# Patient Record
Sex: FEMALE | Race: WHITE | Employment: UNEMPLOYED | URBAN - METROPOLITAN AREA
[De-identification: names, ages, dates, MRNs, and addresses within clinical notes are randomized per-mention and may not be internally consistent; named-entity substitution may affect disease eponyms.]

---

## 2017-06-09 ENCOUNTER — OFFICE VISIT (OUTPATIENT)
Dept: ORTHOPEDIC SURGERY | Age: 52
End: 2017-06-09

## 2017-06-09 VITALS
SYSTOLIC BLOOD PRESSURE: 191 MMHG | BODY MASS INDEX: 18.66 KG/M2 | WEIGHT: 126 LBS | HEIGHT: 69 IN | HEART RATE: 89 BPM | DIASTOLIC BLOOD PRESSURE: 95 MMHG

## 2017-06-09 DIAGNOSIS — M51.36 OTHER INTERVERTEBRAL DISC DEGENERATION, LUMBAR REGION: ICD-10-CM

## 2017-06-09 DIAGNOSIS — M96.1 POSTLAMINECTOMY SYNDROME, CERVICAL: ICD-10-CM

## 2017-06-09 DIAGNOSIS — M54.16 LUMBAR NEURITIS: ICD-10-CM

## 2017-06-09 DIAGNOSIS — M47.816 SPONDYLOSIS OF LUMBAR REGION WITHOUT MYELOPATHY OR RADICULOPATHY: Primary | ICD-10-CM

## 2017-06-09 RX ORDER — HYDROCODONE BITARTRATE AND ACETAMINOPHEN 7.5; 325 MG/1; MG/1
TABLET ORAL
COMMUNITY
End: 2017-10-23 | Stop reason: ALTCHOICE

## 2017-06-09 RX ORDER — DIAZEPAM 5 MG/1
5 TABLET ORAL
COMMUNITY
End: 2017-10-23 | Stop reason: ALTCHOICE

## 2017-06-09 RX ORDER — DULOXETIN HYDROCHLORIDE 30 MG/1
30 CAPSULE, DELAYED RELEASE ORAL DAILY
Qty: 30 CAP | Refills: 1 | Status: SHIPPED | OUTPATIENT
Start: 2017-06-09 | End: 2017-10-23 | Stop reason: ALTCHOICE

## 2017-06-09 RX ORDER — DICLOFENAC SODIUM 75 MG/1
TABLET, DELAYED RELEASE ORAL
COMMUNITY

## 2017-06-09 NOTE — PROGRESS NOTES
[facility]  [address], [address]  Phone: [phone]  Fax: [phone]        INITIAL CONSULTATION      HISTORY OF PRESENT ILLNESS:  Helena Velasco is a 45 y.o. female whom is self-referred secondary to low back pain radiating into the BLE (L>R) extending in an S1 distribution to the ankle with paraesthesias x 1 month. No specific injury/trauma. Her pain is not positional. Pt denies h/o lumbar spinal surgery or blocks. She has not attended physical therapy/chiropractor for the lumbar spine. I last saw patient on 6/9/13 neck pain into the LUE, with lesser symptoms in the RUE. of note, her pain was unrelieved with Morphine. I referred her to Dr. Ryan Saenz for surgical evaluation. This is a patient with a diagnosis of cervical postlaminectomy syndrome. She was previously seen by Dr. Ryan Saenz on 4/24/14 and was noted to be 6 months out from her C4-T3 ACDF with significant relief. Of note, she should continue to be followed by Dr. Emily Rios for pain management. Per patient, she was abruptly discontinued off her Oxycodone and Morphine by Dr. Emily Rios and not weaned off roughly 2-3 years ago which is wished to no longer be under his care. ER note dated 6/4/17 indicating patient presented for chronic low back pain. She was treated with Norco, Valium and Prednisone at that time. Pt is noting good relief with Valium and Prednisone. Note from Dr. Justine Pantoja dated 6/5/17 indicating patient was seen with c/o severe hand and back pain which are not relieved with Norco or NSAIDs. Raynaud's is controlled with Procardia and trental. Of note, patient continues to use marijuana for pain control. It appears as though Dr. Justine Pantoja offered to refer patient to pain management which she declined. Noted, patient has taken Neurontin in the past but does not recall what dose. She states she is no longer taking Zoloft, Xanax or Ambien. Pt denies change in bowel or bladder habits.  Pt denies fever, weight loss, or skin changes. Pt denies h/o stomach ulcers or bleeding disorders. Lumbar spine CT dated 6/4/17 reviewed. Per report, no acute osseous abnormality of the lumbar spine. Transitional lumbosacral anatomy. multielvel degenerative disc disease and facet arthropathy. Atherosclerosis. The patient is RHD.  reviewed. Past Medical History:   Diagnosis Date    Cancer [facility])     Skin    Chronic airway obstruction, not elsewhere classified     Essential hypertension, benign     MILDLY ELEVATED    Nausea & vomiting     Nicotine vapor product user     Personal history of tobacco use, presenting hazards to health     Psychiatric disorder     Depression    Raynaud's disease     Shortness of breath     NORMAL STRESS TEST AND LV FUNCTION. LIKELY FROM COPD AND SMOKING          Past Surgical History:   Procedure Laterality Date    HX HYSTERECTOMY      HX OTHER SURGICAL  2011    NECK DISC SURGERY    HX OTHER SURGICAL      RIGHT WRIST         Social History   Substance Use Topics    Smoking status: Current Every Day Smoker     Packs/day: 0.50     Years: 30.00    Smokeless tobacco: Never Used      Comment: Uses a Nicotine    Alcohol use No     Work status: The patient is unemployed. Marital status: The patient is . Current Outpatient Prescriptions   Medication Sig Dispense Refill    diclofenac EC (VOLTAREN) 75 mg EC tablet Take  by mouth.  HYDROcodone-acetaminophen (NORCO) 7.5-325 mg per tablet Take  by mouth.  diazePAM (VALIUM) 5 mg tablet Take 5 mg by mouth every six (6) hours as needed for Anxiety.  DULoxetine (CYMBALTA) 30 mg capsule Take 1 Cap by mouth daily. 30 Cap 1    NIFEdipine XL (PROCARDIA XL) 30 mg tablet Take 60 mg by mouth daily. Indications: HYPERTENSION      pentoxifylline CR (TRENTAL) 400 mg CR tablet Take 400 mg by mouth three (3) times daily (with meals).       oxyCODONE-acetaminophen (PERCOCET)  mg per tablet Take 1 Tab by mouth every eight (8) hours as needed. Indications: PAIN 90 Tab 0    Morphine (OTILIO) 30 mg ER capsule Take 1 Cap by mouth three (3) times daily. Indications: CHRONIC PAIN 90 Cap 0    sertraline (ZOLOFT) 100 mg tablet Take  by mouth daily. Indications: Depression      ALPRAZolam (XANAX) 0.5 mg tablet Take  by mouth three (3) times daily as needed. Indications: ANXIETY WITH DEPRESSION      primidone (MYSOLINE) 50 mg tablet Take  by mouth nightly. Indications: for tremors      zolpidem (AMBIEN) 10 mg tablet Take  by mouth nightly as needed. No Known Allergies       History reviewed. No pertinent family history. REVIEW OF SYSTEMS  Constitutional symptoms: Negative  Eyes: Negative  Ears, Nose, Throat, and Mouth: Negative  Cardiovascular: Negative  Respiratory: Negative  Genitourinary: Negative  Integumentary (Skin and/or breast): Negative  Musculoskeletal: Positive for low back pain into the BLE. Extremities: Negative for edema. Endocrine/Rheumatologic: Negative  Hematologic/Lymphatic: Negative  Allergic/Immunologic: Negative  Psychiatric: Negative       Ambulates with a single point cane. PHYSICAL EXAMINATION    Visit Vitals    BP (!) 191/95  Comment: has not taken meds    Pulse 89    Ht 5' 9\" (1.753 m)    Wt 126 lb (57.2 kg)    BMI 18.61 kg/m2       CONSTITUTIONAL: NAD, A&O x 3  HEART: Regular rate and rhythm  ABDOMEN: Positive bowel sounds, soft, nontender, and nondistended  LUNGS: Clear to auscultation bilaterally. SKIN: No rashes noted. RANGE OF MOTION: The patient has full passive range of motion in all four extremities. SENSATION: Decreased sensation to light touch at S1 of the LLE. Sensation to light touch otherwise intact. MOTOR:   Straight Leg Raise: Negative, bilateral  Cavazos: Negative, bilateral  Deep tendon reflexes are 1+ at the brachioradialis and triceps, and 2+ at the biceps. Deep tendon reflexes are 0 at the knees and ankles bilaterally.      Shoulder AB/Flex Elbow Flex Wrist Ext Elbow Ext Wrist Flex Hand Intrin Tone   Right +4/5 +4/5 +4/5 +4/5 +4/5 +4/5 +4/5   Left +4/5 +4/5 +4/5 +4/5 +4/5 +4/5 +4/5              Hip Flex Knee Ext Knee Flex Ankle DF GTE Ankle PF Tone   Right +4/5 +4/5 +4/5 +4/5 +4/5 +4/5 +4/5   Left +4/5 +4/5 +4/5 +4/5 +4/5 +4/5 +4/5         ASSESSMENT   Angel Chow was seen today for back pain. Diagnoses and all orders for this visit:    Spondylosis of lumbar region without myelopathy or radiculopathy    Postlaminectomy syndrome, cervical    Lumbar neuritis    Other intervertebral disc degeneration, lumbar region    Other orders  -     DULoxetine (CYMBALTA) 30 mg capsule; Take 1 Cap by mouth daily. IMPRESSIONS/RECOMMENDATIONS:  The patient describes symptoms consistent with S1 radiculopathy. She is neurologically intact. We discussed multiple treatment options. Patient is not interested in surgical intervention or lumbar blocks at this time. In the light of the fact that patient continues to use marijuana for pain control, I will not be prescribing narcotics. I will try her on Cymbalta 30 mg per day. The risks, benefits, and potential side effects of this medication were discussed. Patient understands and wishes to proceed. Patient advised to call the office if intolerant to new medication. She declines referral to pain management and physical therapy at this time. I will see the patient back in 1 month's time or earlier if needed. Written by Adrian Yu, as dictated by Jon Ruiz MD  I examined the patient, reviewed and agree with the note.

## 2017-06-09 NOTE — MR AVS SNAPSHOT
Visit Information Date & Time Provider Department Dept. Phone Encounter #  
 6/9/2017  2:30 PM Kristie Avina MD [facility] - SPECIALTY [facility] [phone] Follow-up Instructions Return in about 4 weeks (around 7/7/2017). Upcoming Health Maintenance Date Due Hepatitis C Screening 1965 Pneumococcal 19-64 Medium Risk (1 of 1 - PPSV23) 4/25/1982 DTaP/Tdap/Td series (1 - Tdap) 4/25/1984 PAP AKA CERVICAL CYTOLOGY 4/25/1984 BREAST CANCER SCRN MAMMOGRAM 4/25/2013 FOBT Q 1 YEAR AGE 50-75 4/25/2013 INFLUENZA AGE 9 TO ADULT 8/1/2017 Allergies as of 6/9/2017  Review Complete On: 6/9/2017 By: Kristie Avina MD  
 No Known Allergies Current Immunizations  Never Reviewed No immunizations on file. Not reviewed this visit You Were Diagnosed With   
  
 Codes Comments Spondylosis of lumbar region without myelopathy or radiculopathy    -  Primary ICD-10-CM: M47.816 ICD-9-CM: 721.3 Postlaminectomy syndrome, cervical     ICD-10-CM: M96.1 ICD-9-CM: 722.81 Lumbar neuritis     ICD-10-CM: M54.16 
ICD-9-CM: 724.4 Other intervertebral disc degeneration, lumbar region     ICD-10-CM: M51.36 
ICD-9-CM: 722.52 Vitals BP Pulse Height(growth percentile) Weight(growth percentile) BMI OB Status (!) 191/95 89 5' 9\" (1.753 m) 126 lb (57.2 kg) 18.61 kg/m2 Hysterectomy Smoking Status Current Every Day Smoker Vitals History BMI and BSA Data Body Mass Index Body Surface Area  
 18.61 kg/m 2 1.67 m 2 Preferred Pharmacy Pharmacy Name Phone [id number] [address] Your Updated Medication List  
  
   
This list is accurate as of: 6/9/17  3:53 PM.  Always use your most recent med list.  
  
  
  
  
 AMBIEN 10 mg tablet Generic drug:  zolpidem Take  by mouth nightly as needed. diclofenac EC 75 mg EC tablet Commonly known as:  VOLTAREN Take  by mouth. DULoxetine 30 mg capsule Commonly known as:  CYMBALTA Take 1 Cap by mouth daily. Morphine 30 mg ER capsule Commonly known as:  OTILIO Take 1 Cap by mouth three (3) times daily. Indications: CHRONIC PAIN  
  
 NORCO 7.5-325 mg per tablet Generic drug:  HYDROcodone-acetaminophen Take  by mouth. oxyCODONE-acetaminophen  mg per tablet Commonly known as:  PERCOCET Take 1 Tab by mouth every eight (8) hours as needed. Indications: PAIN  
  
 primidone 50 mg tablet Commonly known as: MYSOLINE Take  by mouth nightly. Indications: for tremors PROCARDIA XL 30 mg ER tablet Generic drug:  NIFEdipine ER Take 60 mg by mouth daily. Indications: HYPERTENSION  
  
 TRENtal 400 mg CR tablet Generic drug:  pentoxifylline CR Take 400 mg by mouth three (3) times daily (with meals). VALIUM 5 mg tablet Generic drug:  diazePAM  
Take 5 mg by mouth every six (6) hours as needed for Anxiety. XANAX 0.5 mg tablet Generic drug:  ALPRAZolam  
Take  by mouth three (3) times daily as needed. Indications: ANXIETY WITH DEPRESSION  
  
 ZOLOFT 100 mg tablet Generic drug:  sertraline Take  by mouth daily. Indications: Depression Prescriptions Sent to Pharmacy Refills DULoxetine (CYMBALTA) 30 mg capsule 1 Sig: Take 1 Cap by mouth daily. Class: Normal  
 [facility] [address] [place] [address] #: [phone] Route: Oral  
  
Follow-up Instructions Return in about 4 weeks (around 7/7/2017). Introducing [facility]! [facility] introduces [employer] patient portal. Now you can access parts of your medical record, email your doctor's office, and request medication refills online. 1. In your internet browser, go to [email or web address] 2. Click on the First Time User? Click Here link in the Sign In box. You will see the New Member Sign Up page. 3. Enter your [employer] Access Code exactly as it appears below. You will not need to use this code after youve completed the sign-up process. If you do not sign up before the expiration date, you must request a new code. · [employer] Access Code: [email or web address] Expires: 9/5/2017 10:36 AM 
 
4. Enter the last four digits of your Social Security Number (xxxx) and Date of Birth (mm/dd/yyyy) as indicated and click Submit. You will be taken to the next sign-up page. 5. Create a [employer] ID. This will be your [employer] login ID and cannot be changed, so think of one that is secure and easy to remember. 6. Create a [employer] password. You can change your password at any time. 7. Enter your Password Reset Question and Answer. This can be used at a later time if you forget your password. 8. Enter your e-mail address. You will receive e-mail notification when new information is available in [address] 9. Click Sign Up. You can now view and download portions of your medical record. 10. Click the Download Summary menu link to download a portable copy of your medical information. If you have questions, please visit the Frequently Asked Questions section of the [employer] website. Remember, [employer] is NOT to be used for urgent needs. For medical emergencies, dial 911. Now available from your iPhone and Android! Please provide this summary of care documentation to your next provider. Your primary care clinician is listed as Babs Clinton. If you have any questions after today's visit, please call [phone].

## 2017-07-06 ENCOUNTER — TELEPHONE (OUTPATIENT)
Dept: ORTHOPEDIC SURGERY | Age: 52
End: 2017-07-06

## 2017-07-06 NOTE — TELEPHONE ENCOUNTER
Pt has appt Monday 7/10/17 with dr Varsha Simpson:  Pt reports excruciating pain and states she cannot wait to be seen. Offered patient appt for today at [facility] but she declined.   Please advise Y#[phone]

## 2017-07-06 NOTE — TELEPHONE ENCOUNTER
If patient declined to be seen today then she can wait until Monday. We offered her a same day appointment that she declined.  We can not give out medications for pain without an appointment per regulations

## 2017-07-06 NOTE — TELEPHONE ENCOUNTER
Patient last seen 6/9/17 as a new/established patient:    IMPRESSIONS/RECOMMENDATIONS:  The patient describes symptoms consistent with S1 radiculopathy. She is neurologically intact. We discussed multiple treatment options. Patient is not interested in surgical intervention or lumbar blocks at this time. In the light of the fact that patient continues to use marijuana for pain control, I will not be prescribing narcotics. I will try her on Cymbalta 30 mg per day. The risks, benefits, and potential side effects of this medication were discussed. Patient understands and wishes to proceed. Patient advised to call the office if intolerant to new medication. She declines referral to pain management and physical therapy at this time. I will see the patient back in 1 month's time or earlier if needed. Please advise.

## 2017-07-06 NOTE — TELEPHONE ENCOUNTER
Called and left voice mail informing patient per the provider of the below message and also added if she does not feel like she can wait until Monday that she could go to the nearest ER or her PCP.

## 2017-07-10 ENCOUNTER — OFFICE VISIT (OUTPATIENT)
Dept: ORTHOPEDIC SURGERY | Age: 52
End: 2017-07-10

## 2017-07-10 VITALS
BODY MASS INDEX: 17.21 KG/M2 | WEIGHT: 116.2 LBS | RESPIRATION RATE: 16 BRPM | HEIGHT: 69 IN | SYSTOLIC BLOOD PRESSURE: 136 MMHG | HEART RATE: 108 BPM | DIASTOLIC BLOOD PRESSURE: 89 MMHG

## 2017-07-10 DIAGNOSIS — M47.816 SPONDYLOSIS OF LUMBAR REGION WITHOUT MYELOPATHY OR RADICULOPATHY: ICD-10-CM

## 2017-07-10 DIAGNOSIS — M96.1 POSTLAMINECTOMY SYNDROME, CERVICAL: ICD-10-CM

## 2017-07-10 DIAGNOSIS — M54.16 RADICULOPATHY, LUMBAR REGION: ICD-10-CM

## 2017-07-10 DIAGNOSIS — M51.36 OTHER INTERVERTEBRAL DISC DEGENERATION, LUMBAR REGION: Primary | ICD-10-CM

## 2017-07-10 RX ORDER — PREGABALIN 75 MG/1
75 CAPSULE ORAL 2 TIMES DAILY
Qty: 14 CAP | Refills: 0 | Status: SHIPPED | OUTPATIENT
Start: 2017-07-10 | End: 2017-08-28 | Stop reason: SDUPTHER

## 2017-07-10 RX ORDER — PREGABALIN 75 MG/1
75 CAPSULE ORAL 2 TIMES DAILY
Qty: 60 CAP | Refills: 1 | Status: SHIPPED | OUTPATIENT
Start: 2017-07-10 | End: 2017-08-28 | Stop reason: SDUPTHER

## 2017-07-10 NOTE — PROGRESS NOTES
[facility]  [address], [address]   Phone: [phone]  Fax: [phone]        PROGRESS NOTE      HISTORY OF PRESENT ILLNESS:  The patient is a 45 y.o. female and was seen today for follow up of low back pain radiating into the BLE (R>L) extending in an S1 distribution to the ankle with paraesthesias x 2 months. No specific injury/trauma. Her pain is not positional. Pt denies h/o lumbar spinal surgery or blocks. She has not attended physical therapy/chiropractor for the lumbar spine. I last saw patient on 6/9/13 neck pain into the LUE, with lesser symptoms in the RUE. Of note, her pain was unrelieved with Morphine. I referred her to Dr. Geoff Beard for surgical evaluation. This is a patient with a diagnosis of cervical postlaminectomy syndrome. She was previously seen by Dr. Geoff Beard on 4/24/14 and was noted to be 6 months out from her C4-T3 ACDF with significant relief. Of note, she should continue to be followed by Dr. Tiara Cuevas for pain management. Per patient, she was abruptly discontinued off her Oxycodone and Morphine by Dr. Tiara Cuevas and not weaned off roughly 2-3 years ago and wished to no longer be under his care. ER note dated 6/4/17 indicating patient presented for chronic low back pain. She was treated with Norco, Valium and Prednisone at that time. Pt is noting good relief with Valium and Prednisone. Note from Dr. Cipriano Avendano dated 6/5/17 indicating patient was seen with c/o severe hand and back pain which are not relieved with Norco or NSAIDs. Raynaud's is controlled with Procardia and trental. Of note, patient continues to use marijuana for pain control. It appears as though Dr. Cipriano Avendano offered to refer patient to pain management which she declined. Noted, patient has taken Neurontin in the past but does not recall what dose. She states she is no longer taking Zoloft, Xanax or Ambien. Pt denies change in bowel or bladder habits.  Pt denies fever, weight loss, or skin changes. Pt denies h/o stomach ulcers or bleeding disorders. Lumbar spine CT dated 6/4/17 reviewed. Per report, no acute osseous abnormality of the lumbar spine. Transitional lumbosacral anatomy. multilevel degenerative disc disease and facet arthropathy. Atherosclerosis. The patient is RHD. At her last clinical appointment, the patient describes symptoms consistent with S1 radiculopathy. She was neurologically intact. We discussed multiple treatment options. Patient was not interested in surgical intervention or lumbar blocks at that time. In the light of the fact that patient continued to use marijuana for pain control, I would not be prescribing narcotics. I tried her on Cymbalta 30 mg per day. She declined referral to pain management and physical therapy at that time. The patient returns today with pain location and distribution remain unchanged. She rates pain 6-10/10, consistent with her last visit (9/10). Pt reports somnolence with CYMBALTA 30 mg.  reviewed. Past Medical History:   Diagnosis Date    Cancer [facility])     Skin    Chronic airway obstruction, not elsewhere classified     Essential hypertension, benign     MILDLY ELEVATED    Nausea & vomiting     Nicotine vapor product user     Personal history of tobacco use, presenting hazards to health     Psychiatric disorder     Depression    Raynaud's disease     Shortness of breath     NORMAL STRESS TEST AND LV FUNCTION. LIKELY FROM COPD AND SMOKING        Social History     Social History    Marital status:      Spouse name: N/A    Number of children: N/A    Years of education: N/A     Occupational History    Not on file.      Social History Main Topics    Smoking status: Current Every Day Smoker     Packs/day: 0.50     Years: 30.00    Smokeless tobacco: Never Used      Comment: Uses a Nicotine    Alcohol use No    Drug use: No    Sexual activity: Not on file     Other Topics Concern    Not on file     Social History Narrative       Current Outpatient Prescriptions   Medication Sig Dispense Refill    pregabalin (LYRICA) 75 mg capsule Take 1 Cap by mouth two (2) times a day. Max Daily Amount: 150 mg. 14 Cap 0    pregabalin (LYRICA) 75 mg capsule Take 1 Cap by mouth two (2) times a day. Max Daily Amount: 150 mg. 60 Cap 1    diclofenac EC (VOLTAREN) 75 mg EC tablet Take  by mouth.  oxyCODONE-acetaminophen (PERCOCET)  mg per tablet Take 1 Tab by mouth every eight (8) hours as needed. Indications: PAIN 90 Tab 0    NIFEdipine XL (PROCARDIA XL) 30 mg tablet Take 60 mg by mouth daily. Indications: HYPERTENSION      pentoxifylline CR (TRENTAL) 400 mg CR tablet Take 400 mg by mouth three (3) times daily (with meals).  HYDROcodone-acetaminophen (NORCO) 7.5-325 mg per tablet Take  by mouth.  diazePAM (VALIUM) 5 mg tablet Take 5 mg by mouth every six (6) hours as needed for Anxiety.  DULoxetine (CYMBALTA) 30 mg capsule Take 1 Cap by mouth daily. 30 Cap 1    Morphine (OTILIO) 30 mg ER capsule Take 1 Cap by mouth three (3) times daily. Indications: CHRONIC PAIN 90 Cap 0    sertraline (ZOLOFT) 100 mg tablet Take  by mouth daily. Indications: Depression      ALPRAZolam (XANAX) 0.5 mg tablet Take  by mouth three (3) times daily as needed. Indications: ANXIETY WITH DEPRESSION      primidone (MYSOLINE) 50 mg tablet Take  by mouth nightly. Indications: for tremors      zolpidem (AMBIEN) 10 mg tablet Take  by mouth nightly as needed. No Known Allergies     Ambulates with a single point cane. PHYSICAL EXAMINATION    Visit Vitals    /89    Pulse (!) 108    Resp 16    Ht 5' 9\" (1.753 m)    Wt 116 lb 3.2 oz (52.7 kg)    BMI 17.16 kg/m2       CONSTITUTIONAL: NAD, A&O x 3  SENSATION: Intact to light touch throughout  NEURO: Jelani's is negative bilaterally. RANGE OF MOTION: The patient has full passive range of motion in all four extremities.   MOTOR:  Straight Leg Raise: Negative, bilateral  EXTREMITIES: All digits, BLE cool to touch, purplish-red in color - PMHx includes Rayanud's. Hip Flex Knee Ext Knee Flex Ankle DF GTE Ankle PF Tone   Right +4/5 +4/5 +4/5 +4/5 +4/5 +4/5 +4/5   Left +4/5 +4/5 +4/5 +4/5 +4/5 +4/5 +4/5       ASSESSMENT   Mushtaq Carroll was seen today for back pain and leg pain. Diagnoses and all orders for this visit:    Other intervertebral disc degeneration, lumbar region  -     MRI LUMB SPINE WO CONT; Future    Spondylosis of lumbar region without myelopathy or radiculopathy  -     MRI LUMB SPINE WO CONT; Future    Radiculopathy, lumbar region  -     MRI LUMB SPINE WO CONT; Future    Postlaminectomy syndrome, cervical  -     MRI LUMB SPINE WO CONT; Future    Other orders  -     pregabalin (LYRICA) 75 mg capsule; Take 1 Cap by mouth two (2) times a day. Max Daily Amount: 150 mg.  -     pregabalin (LYRICA) 75 mg capsule; Take 1 Cap by mouth two (2) times a day. Max Daily Amount: 150 mg.          IMPRESSION AND PLAN:  Patient is considering lumbar blocks. I will order a lumbar spine MRI. I advised patient to bring copies of films to next visit. She will d/c Cymbalta 30 mg. I will try her on Lyrica 75 mg BID. The risks, benefits, and potential side effects of this medication were discussed. Patient understands and wishes to proceed. Patient advised to call the office if intolerant to new medication. Again, I offered a referral to pain management which she declines. I will see the patient back following MRI. Written by Jonny Melgar, as dictated by Garrison Tanner MD  I examined the patient, reviewed and agree with the note.

## 2017-07-10 NOTE — MR AVS SNAPSHOT
Visit Information Date & Time Provider Department Dept. Phone Encounter #  
 7/10/2017  2:10 PM Deborah Haddad MD [facility] [place] [phone] [id number] Follow-up Instructions Return for following MRI. Upcoming Health Maintenance Date Due Hepatitis C Screening 1965 Pneumococcal 19-64 Medium Risk (1 of 1 - PPSV23) 4/25/1982 DTaP/Tdap/Td series (1 - Tdap) 4/25/1984 PAP AKA CERVICAL CYTOLOGY 4/25/1984 BREAST CANCER SCRN MAMMOGRAM 4/25/2013 FOBT Q 1 YEAR AGE 50-75 4/25/2013 INFLUENZA AGE 9 TO ADULT 8/1/2017 Allergies as of 7/10/2017  Review Complete On: 7/10/2017 By: Deborah Haddad MD  
 No Known Allergies Current Immunizations  Never Reviewed No immunizations on file. Not reviewed this visit You Were Diagnosed With   
  
 Codes Comments Other intervertebral disc degeneration, lumbar region    -  Primary ICD-10-CM: M51.36 
ICD-9-CM: 722.52 Spondylosis of lumbar region without myelopathy or radiculopathy     ICD-10-CM: M47.816 ICD-9-CM: 721.3 Radiculopathy, lumbar region     ICD-10-CM: M54.16 
ICD-9-CM: 724.4 Postlaminectomy syndrome, cervical     ICD-10-CM: M96.1 ICD-9-CM: 722.81 Vitals BP Pulse Resp Height(growth percentile) Weight(growth percentile) BMI  
 136/89 (!) 108 16 5' 9\" (1.753 m) 116 lb 3.2 oz (52.7 kg) 17.16 kg/m2 OB Status Smoking Status Hysterectomy Current Every Day Smoker Vitals History BMI and BSA Data Body Mass Index Body Surface Area  
 17.16 kg/m 2 1.6 m 2 Preferred Pharmacy Pharmacy Name Phone [id number] [facility] [place] [address] Your Updated Medication List  
  
   
This list is accurate as of: 7/10/17  3:38 PM.  Always use your most recent med list.  
  
  
  
  
 AMBIEN 10 mg tablet Generic drug:  zolpidem Take  by mouth nightly as needed. diclofenac EC 75 mg EC tablet Commonly known as:  VOLTAREN Take  by mouth. DULoxetine 30 mg capsule Commonly known as:  CYMBALTA Take 1 Cap by mouth daily. Morphine 30 mg ER capsule Commonly known as:  OTILIO Take 1 Cap by mouth three (3) times daily. Indications: CHRONIC PAIN  
  
 NORCO 7.5-325 mg per tablet Generic drug:  HYDROcodone-acetaminophen Take  by mouth. oxyCODONE-acetaminophen  mg per tablet Commonly known as:  PERCOCET Take 1 Tab by mouth every eight (8) hours as needed. Indications: PAIN * pregabalin 75 mg capsule Commonly known as:  Lisa Steve Take 1 Cap by mouth two (2) times a day. Max Daily Amount: 150 mg.  
  
 * pregabalin 75 mg capsule Commonly known as:  Lisa Steve Take 1 Cap by mouth two (2) times a day. Max Daily Amount: 150 mg.  
  
 primidone 50 mg tablet Commonly known as: MYSOLINE Take  by mouth nightly. Indications: for tremors PROCARDIA XL 30 mg ER tablet Generic drug:  NIFEdipine ER Take 60 mg by mouth daily. Indications: HYPERTENSION  
  
 TRENtal 400 mg CR tablet Generic drug:  pentoxifylline CR Take 400 mg by mouth three (3) times daily (with meals). VALIUM 5 mg tablet Generic drug:  diazePAM  
Take 5 mg by mouth every six (6) hours as needed for Anxiety. XANAX 0.5 mg tablet Generic drug:  ALPRAZolam  
Take  by mouth three (3) times daily as needed. Indications: ANXIETY WITH DEPRESSION  
  
 ZOLOFT 100 mg tablet Generic drug:  sertraline Take  by mouth daily. Indications: Depression * Notice: This list has 2 medication(s) that are the same as other medications prescribed for you. Read the directions carefully, and ask your doctor or other care provider to review them with you. Prescriptions Printed Refills  
 pregabalin (LYRICA) 75 mg capsule 0 Sig: Take 1 Cap by mouth two (2) times a day. Max Daily Amount: 150 mg.  
 Class: Print  Route: Oral  
 pregabalin (LYRICA) 75 mg capsule 1 Sig: Take 1 Cap by mouth two (2) times a day. Max Daily Amount: 150 mg.  
 Class: Print Route: Oral  
  
Follow-up Instructions Return for following MRI. Introducing [facility] SERVICES! [employer] introduces [employer] patient portal. Now you can access parts of your medical record, email your doctor's office, and request medication refills online. 1. In your internet browser, go to [email or web address] 2. Click on the First Time User? Click Here link in the Sign In box. You will see the New Member Sign Up page. 3. Enter your [employer] Access Code exactly as it appears below. You will not need to use this code after youve completed the sign-up process. If you do not sign up before the expiration date, you must request a new code. · [employer] Access Code: [email or web address] Expires: 9/5/2017 10:36 AM 
 
4. Enter the last four digits of your Social Security Number (xxxx) and Date of Birth (mm/dd/yyyy) as indicated and click Submit. You will be taken to the next sign-up page. 5. Create a [employer] ID. This will be your [employer] login ID and cannot be changed, so think of one that is secure and easy to remember. 6. Create a [employer] password. You can change your password at any time. 7. Enter your Password Reset Question and Answer. This can be used at a later time if you forget your password. 8. Enter your e-mail address. You will receive e-mail notification when new information is available in [address] 9. Click Sign Up. You can now view and download portions of your medical record. 10. Click the Download Summary menu link to download a portable copy of your medical information. If you have questions, please visit the Frequently Asked Questions section of the [employer] website. Remember, [employer] is NOT to be used for urgent needs. For medical emergencies, dial 911. Now available from your iPhone and Android! Please provide this summary of care documentation to your next provider. Your primary care clinician is listed as Alana Barrera. If you have any questions after today's visit, please call [phone].

## 2017-07-12 ENCOUNTER — PATIENT MESSAGE (OUTPATIENT)
Dept: ORTHOPEDIC SURGERY | Age: 52
End: 2017-07-12

## 2017-07-17 ENCOUNTER — DOCUMENTATION ONLY (OUTPATIENT)
Dept: ORTHOPEDIC SURGERY | Age: 52
End: 2017-07-17

## 2017-07-17 NOTE — PROGRESS NOTES
MRI Lumbar without is scheduled at [facility], [id number], on 07/29/17, arrive 10:15AM, test 10:45AM. [employer] pre-authorization [id number], effective 07/17/17-09/14/17

## 2017-07-20 ENCOUNTER — TELEPHONE (OUTPATIENT)
Dept: ORTHOPEDIC SURGERY | Age: 52
End: 2017-07-20

## 2017-07-20 NOTE — TELEPHONE ENCOUNTER
Called patients insurance and did a verbal prior authorization and received an approval for the Lyrica 75 mg #60. Case Id [id number] good from 7/20/27 until 7/20/18. Called and informed patient that her medication has been approved and she should be able to go to the pharmacy. Patient verbalized understanding.

## 2017-07-20 NOTE — TELEPHONE ENCOUNTER
Patient states that she is out of her Lyrica. She is following the directions of 1 at night for 7 ds and on the 8th day take bid (1 in the morning, 1 at night). The pharmacy is now saying the medication is not covered by her insurance. She is asking what to do the Lyrica was helping her. The pharmacy is faxing over paperwork for a prior auth. She is scared that without the Lyrica she will be back in the ED. Patient can be reached at [phone].

## 2017-07-24 ENCOUNTER — PATIENT MESSAGE (OUTPATIENT)
Dept: ORTHOPEDIC SURGERY | Age: 52
End: 2017-07-24

## 2017-08-01 ENCOUNTER — TELEPHONE (OUTPATIENT)
Dept: ORTHOPEDIC SURGERY | Age: 52
End: 2017-08-01

## 2017-08-01 RX ORDER — DULOXETIN HYDROCHLORIDE 30 MG/1
CAPSULE, DELAYED RELEASE ORAL
Qty: 30 CAP | Refills: 1 | OUTPATIENT
Start: 2017-08-01

## 2017-08-07 NOTE — TELEPHONE ENCOUNTER
Patient called in states that the [facility] has not received a refill request from our office yet in regards to the Lyrica Rx from [facility]. Pt can be reached at [phone]. Pt stated that [facility] off [address] is correct pharmacy.

## 2017-08-09 NOTE — TELEPHONE ENCOUNTER
PATIENT CALLED AGAIN WANTING TO KNOW WHEN THE MEDICATION WILL BE CALLED INTO [facility]. PATIENT TEL. [phone].

## 2017-08-09 NOTE — TELEPHONE ENCOUNTER
Medication has been called in to the pharmacy. Called patient to inform her but did not get an answer.

## 2017-08-28 ENCOUNTER — OFFICE VISIT (OUTPATIENT)
Dept: ORTHOPEDIC SURGERY | Age: 52
End: 2017-08-28

## 2017-08-28 VITALS
WEIGHT: 130 LBS | DIASTOLIC BLOOD PRESSURE: 79 MMHG | BODY MASS INDEX: 19.26 KG/M2 | HEIGHT: 69 IN | SYSTOLIC BLOOD PRESSURE: 176 MMHG | HEART RATE: 75 BPM

## 2017-08-28 DIAGNOSIS — M47.816 SPONDYLOSIS OF LUMBAR REGION WITHOUT MYELOPATHY OR RADICULOPATHY: ICD-10-CM

## 2017-08-28 DIAGNOSIS — M51.26 LUMBAR HERNIATED DISC: Primary | ICD-10-CM

## 2017-08-28 DIAGNOSIS — M51.36 OTHER INTERVERTEBRAL DISC DEGENERATION, LUMBAR REGION: ICD-10-CM

## 2017-08-28 DIAGNOSIS — M96.1 POSTLAMINECTOMY SYNDROME, CERVICAL: ICD-10-CM

## 2017-08-28 DIAGNOSIS — M48.061 LUMBAR SPINAL STENOSIS: ICD-10-CM

## 2017-08-28 DIAGNOSIS — M54.16 RADICULOPATHY, LUMBAR REGION: ICD-10-CM

## 2017-08-28 RX ORDER — PREGABALIN 150 MG/1
150 CAPSULE ORAL 2 TIMES DAILY
Qty: 60 CAP | Refills: 1 | Status: SHIPPED | OUTPATIENT
Start: 2017-08-28 | End: 2017-10-23 | Stop reason: SDUPTHER

## 2017-08-28 NOTE — MR AVS SNAPSHOT
Visit Information Date & Time Provider Department Dept. Phone Encounter #  
 8/28/2017  2:55 PM Nilda Graham MD [address] [facility] [phone] [id number] Follow-up Instructions Return in about 4 weeks (around 9/25/2017). Upcoming Health Maintenance Date Due Hepatitis C Screening 1965 Pneumococcal 19-64 Medium Risk (1 of 1 - PPSV23) 4/25/1982 DTaP/Tdap/Td series (1 - Tdap) 4/25/1984 PAP AKA CERVICAL CYTOLOGY 4/25/1984 BREAST CANCER SCRN MAMMOGRAM 4/25/2013 FOBT Q 1 YEAR AGE 50-75 4/25/2013 INFLUENZA AGE 9 TO ADULT 8/1/2017 Allergies as of 8/28/2017  Review Complete On: 8/28/2017 By: Nilda Graham MD  
 No Known Allergies Current Immunizations  Never Reviewed No immunizations on file. Not reviewed this visit You Were Diagnosed With   
  
 Codes Comments Lumbar herniated disc    -  Primary ICD-10-CM: M51.26 
ICD-9-CM: 722.10 Lumbar spinal stenosis     ICD-10-CM: M48.06 
ICD-9-CM: 724.02 Spondylosis of lumbar region without myelopathy or radiculopathy     ICD-10-CM: M47.816 ICD-9-CM: 721.3 Radiculopathy, lumbar region     ICD-10-CM: M54.16 
ICD-9-CM: 724.4 Postlaminectomy syndrome, cervical     ICD-10-CM: M96.1 ICD-9-CM: 722.81 Other intervertebral disc degeneration, lumbar region     ICD-10-CM: M51.36 
ICD-9-CM: 722.52 Vitals BP Pulse Height(growth percentile) Weight(growth percentile) BMI OB Status 176/79 75 5' 9\" (1.753 m) 130 lb (59 kg) 19.2 kg/m2 Hysterectomy Smoking Status Current Every Day Smoker Vitals History BMI and BSA Data Body Mass Index Body Surface Area  
 19.2 kg/m 2 1.69 m 2 Preferred Pharmacy Pharmacy Name Phone [id number] [place] [address] Your Updated Medication List  
  
   
 This list is accurate as of: 8/28/17  4:30 PM.  Always use your most recent med list.  
  
  
  
  
 AMBIEN 10 mg tablet Generic drug:  zolpidem Take  by mouth nightly as needed. diclofenac EC 75 mg EC tablet Commonly known as:  VOLTAREN Take  by mouth. DULoxetine 30 mg capsule Commonly known as:  CYMBALTA Take 1 Cap by mouth daily. Morphine 30 mg ER capsule Commonly known as:  OTILIO Take 1 Cap by mouth three (3) times daily. Indications: CHRONIC PAIN  
  
 NORCO 7.5-325 mg per tablet Generic drug:  HYDROcodone-acetaminophen Take  by mouth. oxyCODONE-acetaminophen  mg per tablet Commonly known as:  PERCOCET Take 1 Tab by mouth every eight (8) hours as needed. Indications: PAIN  
  
 primidone 50 mg tablet Commonly known as: MYSOLINE Take  by mouth nightly. Indications: for tremors PROCARDIA XL 30 mg ER tablet Generic drug:  NIFEdipine ER Take 60 mg by mouth daily. Indications: HYPERTENSION  
  
 TRENtal 400 mg CR tablet Generic drug:  pentoxifylline CR Take 400 mg by mouth three (3) times daily (with meals). VALIUM 5 mg tablet Generic drug:  diazePAM  
Take 5 mg by mouth every six (6) hours as needed for Anxiety. XANAX 0.5 mg tablet Generic drug:  ALPRAZolam  
Take  by mouth three (3) times daily as needed. Indications: ANXIETY WITH DEPRESSION  
  
 ZOLOFT 100 mg tablet Generic drug:  sertraline Take  by mouth daily. Indications: Depression Follow-up Instructions Return in about 4 weeks (around 9/25/2017). Introducing [facility]! Dear Hnuter Marques: Thank you for requesting a [employer] account. Our records indicate that you already have an active [employer] account. You can access your account anytime at [email or web address] Did you know that you can access your hospital and ER discharge instructions at any time in [employer]?   You can also review all of your test results from your hospital stay or ER visit. Additional Information If you have questions, please visit the Frequently Asked Questions section of the [employer] website at [email or web address] Remember, [employer] is NOT to be used for urgent needs. For medical emergencies, dial 911. Now available from your iPhone and Android! Please provide this summary of care documentation to your next provider. Your primary care clinician is listed as Alana Barrera. If you have any questions after today's visit, please call [phone].

## 2017-08-28 NOTE — PROGRESS NOTES
[facility]  [address] [place], [place]   Phone: [phone]  Fax: [phone]        PROGRESS NOTE      HISTORY OF PRESENT ILLNESS:  The patient is a 45 y.o. female and was seen today for follow up of low back pain radiating into the BLE (R>L) extending in an S1 distribution to the ankle with paraesthesias ongoing since 5/2017. No specific injury/trauma. Her pain is not positional. Pt denies h/o lumbar spinal surgery or blocks. She has not attended physical therapy/chiropractor for the lumbar spine. I last saw patient on 6/9/13 neck pain into the LUE, with lesser symptoms in the RUE. Of note, her pain was unrelieved with Morphine. I referred her to Dr. Damon Simmonds for surgical evaluation. This is a patient with a diagnosis of cervical postlaminectomy syndrome. She was previously seen by Dr. Damon Simmonds on 4/24/14 and was noted to be 6 months out from her C4-T3 ACDF with significant relief. Of note, she should continue to be followed by Dr. Bong Bean for pain management. Per patient, she was abruptly discontinued off her Oxycodone and Morphine by Dr. Bong Bean and not weaned off roughly 2-3 years ago and wished to no longer be under his care. ER note dated 6/4/17 indicating patient presented for chronic low back pain. She was treated with Norco, Valium and Prednisone at that time. Pt is noting good relief with Valium and Prednisone. Note from Dr. Ferreira Files dated 6/5/17 indicating patient was seen with c/o severe hand and back pain which are not relieved with Norco or NSAIDs. Raynaud's is controlled with Procardia and Trental. Of note, patient continues to use marijuana for pain control. It appears as though Dr. Ferreira Files offered to refer patient to pain management which she declined. Pt reports somnolence with CYMBALTA 30 mg. She states she is no longer taking Zoloft, Xanax or Ambien. Noted, patient has taken Neurontin in the past but does not recall what dose she was placed on.  Pt denies change in bowel or bladder habits. Pt denies fever, weight loss, or skin changes. Pt denies h/o stomach ulcers or bleeding disorders. Lumbar spine CT dated 6/4/17 reviewed. Per report, no acute osseous abnormality of the lumbar spine. Transitional lumbosacral anatomy. multilevel degenerative disc disease and facet arthropathy. Atherosclerosis. The patient is RHD. At her last clinical appointment, patient was considering lumbar blocks. I ordered a lumbar spine MRI. She should d/c Cymbalta 30 mg. I tried her on Lyrica. Again, I offered a referral to pain management which she declined. The patient returns today with low back pain radiating into the BLE (L>R) extending in an S1 distribution to the ankle with paraesthesias . She rates pain 3/10, an improvement since her last visit (6-10/10). Pt is tolerating Lyrica 100 mg BID x 2 weeks with benefit. She has concerns of LLE edema with h/o previous surgery along with left foot drop. However, foot drop was not noted on manual motor testing. Lumbar spine MRI dated 7/29/17 reviewed. Per report, transitional lumbosacral vertebra with partial lumbarization of S1, hypoplastic otherwise unremarkable S1-2 level. Conjoined left S2 and S3 nerve roots. Severe L3-4 and moderate L4-5 central stenosis, from a combination of disc bulging and spondylosis with outer annular fissure, associated mild to moderate facet joint osteoarthritis and ligamentum flavum thickening/buckling. Superimposed moderate-sized right posterolateral extruded L3-4 disc herniation with inferior migration of sizable fragment in the right anterior epidural space to nearly the L4-5 disc level, mild flattening of the right ventral thecal sac with impingement of the right traversing L4 and posterior defection of descending intrathecal roots. Moderate degenerative disc disease with mild disc bulging and spondylosis, L5-S1. Minimal disc bulge, L2-3.  Mild multilevel foraminal stenosis but no evident exiting root impingement.  reviewed. Past Medical History:   Diagnosis Date    Cancer [facility])     Skin    Chronic airway obstruction, not elsewhere classified     Essential hypertension, benign     MILDLY ELEVATED    Nausea & vomiting     Nicotine vapor product user     Personal history of tobacco use, presenting hazards to health     Psychiatric disorder     Depression    Raynaud's disease     Shortness of breath     NORMAL STRESS TEST AND LV FUNCTION. LIKELY FROM COPD AND SMOKING        Social History     Social History    Marital status:      Spouse name: N/A    Number of children: N/A    Years of education: N/A     Occupational History    Not on file. Social History Main Topics    Smoking status: Current Every Day Smoker     Packs/day: 0.50     Years: 30.00    Smokeless tobacco: Never Used      Comment: Uses a Nicotine    Alcohol use No    Drug use: Not on file    Sexual activity: Not on file     Other Topics Concern    Not on file     Social History Narrative       Current Outpatient Prescriptions   Medication Sig Dispense Refill    pregabalin (LYRICA) 150 mg capsule Take 1 Cap by mouth two (2) times a day. Max Daily Amount: 300 mg. 60 Cap 1    diclofenac EC (VOLTAREN) 75 mg EC tablet Take  by mouth.  NIFEdipine XL (PROCARDIA XL) 30 mg tablet Take 60 mg by mouth daily. Indications: HYPERTENSION      pentoxifylline CR (TRENTAL) 400 mg CR tablet Take 400 mg by mouth three (3) times daily (with meals).  HYDROcodone-acetaminophen (NORCO) 7.5-325 mg per tablet Take  by mouth.  diazePAM (VALIUM) 5 mg tablet Take 5 mg by mouth every six (6) hours as needed for Anxiety.  DULoxetine (CYMBALTA) 30 mg capsule Take 1 Cap by mouth daily. (Patient not taking: Reported on 8/28/2017) 30 Cap 1    oxyCODONE-acetaminophen (PERCOCET)  mg per tablet Take 1 Tab by mouth every eight (8) hours as needed.  Indications: PAIN (Patient not taking: Reported on 8/28/2017) 90 Tab 0  Morphine (OTILIO) 30 mg ER capsule Take 1 Cap by mouth three (3) times daily. Indications: CHRONIC PAIN (Patient not taking: Reported on 8/28/2017) 90 Cap 0    sertraline (ZOLOFT) 100 mg tablet Take  by mouth daily. Indications: Depression      ALPRAZolam (XANAX) 0.5 mg tablet Take  by mouth three (3) times daily as needed. Indications: ANXIETY WITH DEPRESSION      primidone (MYSOLINE) 50 mg tablet Take  by mouth nightly. Indications: for tremors      zolpidem (AMBIEN) 10 mg tablet Take  by mouth nightly as needed. No Known Allergies       PHYSICAL EXAMINATION    Visit Vitals    /79    Pulse 75    Ht 5' 9\" (1.753 m)    Wt 130 lb (59 kg)    BMI 19.2 kg/m2       CONSTITUTIONAL: NAD, A&O x 3  SENSATION: Intact to light touch throughout  RANGE OF MOTION: The patient has full passive range of motion in all four extremities. MOTOR:  No apparent foot drop upon ambulation. Straight Leg Raise: Negative, bilateral   EXTREMITIES: Mild edema, LLE. Hip Flex Knee Ext Knee Flex Ankle DF GTE Ankle PF Tone   Right +4/5 +4/5 +4/5 +4/5 +4/5 +4/5 +4/5   Left +4/5 +4/5 +4/5 +4/5 +4/5 +4/5 +4/5       ASSESSMENT   Diagnoses and all orders for this visit:    1. Lumbar herniated disc    2. Lumbar spinal stenosis    3. Spondylosis of lumbar region without myelopathy or radiculopathy    4. Radiculopathy, lumbar region    5. Postlaminectomy syndrome, cervical    6. Other intervertebral disc degeneration, lumbar region    Other orders  -     pregabalin (LYRICA) 150 mg capsule; Take 1 Cap by mouth two (2) times a day. Max Daily Amount: 300 mg. IMPRESSION AND PLAN:  Patient is neurologically intact. She wishes to attempt an increased dose of Lyrica. I will increase her Lyrica to 150 mg BID. Patient advised to call the office if intolerant to higher dose. I will see the patient back in 1 month's time or earlier if needed.       Written by Carissa Garza, as dictated by Vamshi Rice, MD  I examined the patient, reviewed and agree with the note.

## 2017-09-25 ENCOUNTER — OFFICE VISIT (OUTPATIENT)
Dept: ORTHOPEDIC SURGERY | Age: 52
End: 2017-09-25

## 2017-09-25 VITALS
WEIGHT: 133.6 LBS | HEIGHT: 69 IN | SYSTOLIC BLOOD PRESSURE: 171 MMHG | OXYGEN SATURATION: 89 % | HEART RATE: 64 BPM | RESPIRATION RATE: 16 BRPM | DIASTOLIC BLOOD PRESSURE: 82 MMHG | BODY MASS INDEX: 19.79 KG/M2

## 2017-09-25 DIAGNOSIS — M47.816 SPONDYLOSIS OF LUMBAR REGION WITHOUT MYELOPATHY OR RADICULOPATHY: Primary | ICD-10-CM

## 2017-09-25 DIAGNOSIS — M54.16 RADICULOPATHY, LUMBAR REGION: ICD-10-CM

## 2017-09-25 DIAGNOSIS — M51.36 OTHER INTERVERTEBRAL DISC DEGENERATION, LUMBAR REGION: ICD-10-CM

## 2017-09-25 RX ORDER — PREGABALIN 225 MG/1
225 CAPSULE ORAL 2 TIMES DAILY
Qty: 60 CAP | Refills: 1 | Status: SHIPPED | OUTPATIENT
Start: 2017-09-25 | End: 2017-10-23

## 2017-09-25 NOTE — MR AVS SNAPSHOT
Visit Information Date & Time Provider Department Dept. Phone Encounter #  
 9/25/2017  2:20 PM Charlene Stanford MD [facility] [phone] Follow-up Instructions Return in about 4 weeks (around 10/23/2017). Upcoming Health Maintenance Date Due Hepatitis C Screening 1965 Pneumococcal 19-64 Medium Risk (1 of 1 - PPSV23) 4/25/1982 DTaP/Tdap/Td series (1 - Tdap) 4/25/1984 PAP AKA CERVICAL CYTOLOGY 4/25/1984 BREAST CANCER SCRN MAMMOGRAM 4/25/2013 FOBT Q 1 YEAR AGE 50-75 4/25/2013 INFLUENZA AGE 9 TO ADULT 8/1/2017 Allergies as of 9/25/2017  Review Complete On: 9/25/2017 By: Charlene Stanford MD  
 No Known Allergies Current Immunizations  Never Reviewed No immunizations on file. Not reviewed this visit You Were Diagnosed With   
  
 Codes Comments Spondylosis of lumbar region without myelopathy or radiculopathy    -  Primary ICD-10-CM: M47.816 ICD-9-CM: 721.3 Other intervertebral disc degeneration, lumbar region     ICD-10-CM: M51.36 
ICD-9-CM: 722.52 Radiculopathy, lumbar region     ICD-10-CM: M54.16 
ICD-9-CM: 724.4 Vitals BP Pulse Resp Height(growth percentile) Weight(growth percentile) SpO2  
 171/82 (BP 1 Location: Left arm, BP Patient Position: Sitting) 64 16 5' 9\" (1.753 m) 133 lb 9.6 oz (60.6 kg) (!) 89% BMI OB Status Smoking Status 19.73 kg/m2 Hysterectomy Former Smoker Vitals History BMI and BSA Data Body Mass Index Body Surface Area  
 19.73 kg/m 2 1.72 m 2 Preferred Pharmacy Pharmacy Name Phone [id number] [address] Your Updated Medication List  
  
   
This list is accurate as of: 9/25/17  3:48 PM.  Always use your most recent med list.  
  
  
  
  
 AMBIEN 10 mg tablet Generic drug:  zolpidem Take  by mouth nightly as needed. diclofenac EC 75 mg EC tablet Commonly known as:  VOLTAREN Take  by mouth. DULoxetine 30 mg capsule Commonly known as:  CYMBALTA Take 1 Cap by mouth daily. Morphine 30 mg ER capsule Commonly known as:  OTILIO Take 1 Cap by mouth three (3) times daily. Indications: CHRONIC PAIN  
  
 NORCO 7.5-325 mg per tablet Generic drug:  HYDROcodone-acetaminophen Take  by mouth. oxyCODONE-acetaminophen  mg per tablet Commonly known as:  PERCOCET Take 1 Tab by mouth every eight (8) hours as needed. Indications: PAIN * pregabalin 150 mg capsule Commonly known as:  [facility] Take 1 Cap by mouth two (2) times a day. Max Daily Amount: 300 mg.  
  
 * pregabalin 225 mg capsule Commonly known as:  [facility] Take 1 Cap by mouth two (2) times a day. Max Daily Amount: 450 mg.  
  
 primidone 50 mg tablet Commonly known as: MYSOLINE Take  by mouth nightly. Indications: for tremors PROCARDIA XL 30 mg ER tablet Generic drug:  NIFEdipine ER Take 60 mg by mouth daily. Indications: HYPERTENSION  
  
 TRENtal 400 mg CR tablet Generic drug:  pentoxifylline CR Take 400 mg by mouth three (3) times daily (with meals). VALIUM 5 mg tablet Generic drug:  diazePAM  
Take 5 mg by mouth every six (6) hours as needed for Anxiety. XANAX 0.5 mg tablet Generic drug:  ALPRAZolam  
Take  by mouth three (3) times daily as needed. Indications: ANXIETY WITH DEPRESSION  
  
 ZOLOFT 100 mg tablet Generic drug:  sertraline Take  by mouth daily. Indications: Depression * Notice: This list has 2 medication(s) that are the same as other medications prescribed for you. Read the directions carefully, and ask your doctor or other care provider to review them with you. Prescriptions Printed Refills  
 pregabalin (LYRICA) 225 mg capsule 1 Sig: Take 1 Cap by mouth two (2) times a day. Max Daily Amount: 450 mg.  
 Class: Print  Route: Oral  
  
 Follow-up Instructions Return in about 4 weeks (around 10/23/2017). Introducing [facility] SERVICES! Dear Rachel Fabian: Thank you for requesting a [employer] account. Our records indicate that you already have an active [employer] account. You can access your account anytime at [email or web address] Did you know that you can access your hospital and ER discharge instructions at any time in [employer]? You can also review all of your test results from your hospital stay or ER visit. Additional Information If you have questions, please visit the Frequently Asked Questions section of the [employer] website at [email or web address] Remember, [employer]Search is NOT to be used for urgent needs. For medical emergencies, dial 911. Now available from your iPhone and Android! Please provide this summary of care documentation to your next provider. Your primary care clinician is listed as Areli Roles. If you have any questions after today's visit, please call [phone].

## 2017-10-20 NOTE — TELEPHONE ENCOUNTER
Resolved in separate telephone message as this original message fell into an erroneous message pool. Steps have been taken to prevent this from happening to messages in the future.

## 2017-10-23 ENCOUNTER — OFFICE VISIT (OUTPATIENT)
Dept: ORTHOPEDIC SURGERY | Age: 52
End: 2017-10-23

## 2017-10-23 VITALS
BODY MASS INDEX: 20.29 KG/M2 | SYSTOLIC BLOOD PRESSURE: 172 MMHG | DIASTOLIC BLOOD PRESSURE: 80 MMHG | HEART RATE: 79 BPM | WEIGHT: 137 LBS | HEIGHT: 69 IN

## 2017-10-23 DIAGNOSIS — M47.816 SPONDYLOSIS OF LUMBAR REGION WITHOUT MYELOPATHY OR RADICULOPATHY: ICD-10-CM

## 2017-10-23 DIAGNOSIS — I73.00 RAYNAUD'S DISEASE WITHOUT GANGRENE: Chronic | ICD-10-CM

## 2017-10-23 DIAGNOSIS — M54.16 RADICULOPATHY, LUMBAR REGION: Primary | ICD-10-CM

## 2017-10-23 RX ORDER — PREGABALIN 300 MG/1
300 CAPSULE ORAL 2 TIMES DAILY
Qty: 60 CAP | Refills: 1 | Status: SHIPPED | OUTPATIENT
Start: 2017-10-23 | End: 2017-12-04 | Stop reason: SDUPTHER

## 2017-10-23 NOTE — PATIENT INSTRUCTIONS

## 2017-10-23 NOTE — PROGRESS NOTES
[address], [address]  [facility], [address]  Phone: [phone]  Fax: [phone]    Austin Phillips  : 1965  PCP: Niko Davis MD    PROGRESS NOTE    HISTORY OF PRESENT ILLNESS:  Chief Complaint   Patient presents with    Follow-up     distal BLE numbness     Aydin Feliz is a 45 y.o.  female with history of low back pain radiating into hte BLE L>R in an S1 distribution to the ankle. She has a diagnosis of cervical postlami syndrome. She ad a C4-T3 ACDF on 2014 and was later being followed by Dr. Chadd Bryan for PM.  Per patient, she was abruptly weaned off of Oxycodone and Morphine 2-3 years ago. She continues to use marijuana for pain control. She has failed Cymbalta and gabapentin in the past. At her last visit, Lyrica was increased to 225 mg BID. Today,  She states her left leg is essentially normal and her right calf is improving but there is still a sensation of mumbness. She states she has numbness in her toes and this has been there for years. She has history of Raynaud. She feels the Lyrica is working well. Denies bladder/bowel dysfunction, saddle paresthesia, weakness, gait disturbance, or other neurological deficit. Pt at this time desires to  continue with current care/proceed with medication evaluation. Lumbar spine MRI dated 17 reviewed. Per report, transitional lumbosacral vertebra with partial lumbarization of S1, hypoplastic otherwise unremarkable S1-2 level. Conjoined left S2 and S3 nerve roots. Severe L3-4 and moderate L4-5 central stenosis, from a combination of disc bulging and spondylosis with outer annular fissure, associated mild to moderate facet joint osteoarthritis and ligamentum flavum thickening/buckling.  Superimposed moderate-sized right posterolateral extruded L3-4 disc herniation with inferior migration of sizable fragment in the right anterior epidural space to nearly the L4-5 disc level, mild flattening of the right ventral thecal sac with impingement of the right traversing L4 and posterior defection of descending intrathecal roots. Moderate degenerative disc disease with mild disc bulging and spondylosis, L5-S1. Minimal disc bulge, L2-3. Mild multilevel foraminal stenosis but no evident exiting root impingement. ASSESSMENT  45 y.o. female with low back pain and right calf pain. Diagnoses and all orders for this visit:    1. Radiculopathy, lumbar region  -     pregabalin (LYRICA) 300 mg capsule; Take 1 Cap by mouth two (2) times a day. Max Daily Amount: 600 mg.    2. Spondylosis of lumbar region without myelopathy or radiculopathy  -     pregabalin (LYRICA) 300 mg capsule; Take 1 Cap by mouth two (2) times a day. Max Daily Amount: 600 mg.    3. Raynaud's disease without gangrene       IMPRESSION/PLAN    1) Pt was given information on lumbar exercises. 2) Increase Lyrica to 300 mg BID. 3) Ms. Marquis Pabon has a reminder for a \"due or due soon\" health maintenance. I have asked that she contact her primary care provider, Don Garcia MD, for follow-up on this health maintenance. 4) We have informed patient to notify us for immediate appointment if he has any worsening neurogical symptoms or if an emergency situation presents, then call 911  5) Pt will follow-up in 6 weeks for med FU. No pain behaviors. Denies thoughts of harming self or others. Pt has a good risk to benefit ratio which allows the pt to function in a home environment without side effects.          PAST MEDICAL HISTORY  Past Medical History:   Diagnosis Date    Cancer [facility])     Skin    Chronic airway obstruction, not elsewhere classified     Essential hypertension, benign     MILDLY ELEVATED    Nausea & vomiting     Nicotine vapor product user     Personal history of tobacco use, presenting hazards to health     Psychiatric disorder     Depression    Raynaud's disease     Shortness of breath NORMAL STRESS TEST AND LV FUNCTION. LIKELY FROM COPD AND SMOKING        MEDICATIONS  Current Outpatient Prescriptions   Medication Sig Dispense Refill    pregabalin (LYRICA) 300 mg capsule Take 1 Cap by mouth two (2) times a day. Max Daily Amount: 600 mg. 60 Cap 1    diclofenac EC (VOLTAREN) 75 mg EC tablet Take  by mouth.  NIFEdipine XL (PROCARDIA XL) 30 mg tablet Take 60 mg by mouth daily. Indications: HYPERTENSION      pentoxifylline CR (TRENTAL) 400 mg CR tablet Take 400 mg by mouth three (3) times daily (with meals). ALLERGIES  No Known Allergies    SOCIAL HISTORY    Social History     Social History    Marital status:      Spouse name: N/A    Number of children: N/A    Years of education: N/A     Occupational History    Not on file. Social History Main Topics    Smoking status: Former Smoker     Packs/day: 0.50     Years: 30.00    Smokeless tobacco: Never Used      Comment: Uses a Nicotine    Alcohol use No    Drug use: Not on file    Sexual activity: Not on file     Other Topics Concern    Not on file     Social History Narrative       SUBJECTIVE      Pain Scale: 0 - No pain/10    Pain Assessment  10/23/2017   Location of Pain Leg   Location Modifiers Left;Right   Severity of Pain 0   Quality of Pain (No Data)   Quality of Pain Comment numbness   Duration of Pain Persistent   Frequency of Pain Constant   Aggravating Factors -   Aggravating Factors Comment -   Limiting Behavior No   Relieving Factors Nothing   Relieving Factors Comment -   Result of Injury No       Accompanied by self. REVIEW OF SYSTEMS  ROS    Constitutional: Negative for fever, chills, or weight change. Respiratory: Negative for cough or shortness of breath. Cardiovascular: Negative for chest pain or palpitations. Gastrointestinal: Negative for acid reflux, change in bowel habits, or constipation. Genitourinary: Negative for incontinence, dysuria and flank pain.    Musculoskeletal: Positive for low back pain. Skin: Negative for rash. Neurological: Negative for headaches, dizziness, or numbness. Endo/Heme/Allergies: Negative . Psychiatric/Behavioral: Negative. PHYSICAL EXAMINATION  Visit Vitals    /80    Pulse 79    Ht 5' 9\" (1.753 m)    Wt 137 lb (62.1 kg)    BMI 20.23 kg/m2       Constitutional: Well developed,  well nourished,  awake, alert, and in no acute distress. Neurological:  Sensation to light touch is intact. Psychiatric: Affect and mood are appropriate. Integumentary: No rashes or abrasions noted on exposed areas,  warm, dry and intact. Cardiovascular/Peripheral Vascular:  No peripheral edema is noted. Lymphatic:  No evidence of lymphedema. No cervical lymphadenopathy. SPINE/MUSCULOSKELETAL EXAM    Lumbar spine:  No rash, ecchymosis, or gross obliquity. No fasciculations. No focal atrophy is noted. Range of motion is intact. NoTenderness to palpation . SI joints non-tender. Trochanters non tender. Musculoskeletal:  No pain with extension, axial loading, or forward flexion. No pain with internal or external rotation of her hips. MOTOR     Hip Flex  Quads Hamstrings Ankle DF EHL Ankle PF   Right +4/5 +4/5 +4/5 +4/5 +4/5 +4/5   Left +4/5 +4/5 +4/5 +4/5 +4/5 +4/5      Straight Leg raise negative bilaterally. normal gait and station    Ambulation without assistive device. full weight bearing, non-antalgic gait.     Devaughn Opitz, NP

## 2017-12-04 ENCOUNTER — OFFICE VISIT (OUTPATIENT)
Dept: ORTHOPEDIC SURGERY | Age: 52
End: 2017-12-04

## 2017-12-04 VITALS
BODY MASS INDEX: 20.85 KG/M2 | DIASTOLIC BLOOD PRESSURE: 95 MMHG | RESPIRATION RATE: 14 BRPM | HEIGHT: 68 IN | WEIGHT: 137.6 LBS | SYSTOLIC BLOOD PRESSURE: 147 MMHG | HEART RATE: 76 BPM

## 2017-12-04 DIAGNOSIS — M54.16 RADICULOPATHY, LUMBAR REGION: Primary | ICD-10-CM

## 2017-12-04 DIAGNOSIS — M47.816 SPONDYLOSIS OF LUMBAR REGION WITHOUT MYELOPATHY OR RADICULOPATHY: ICD-10-CM

## 2017-12-04 RX ORDER — PREGABALIN 300 MG/1
300 CAPSULE ORAL 2 TIMES DAILY
Qty: 60 CAP | Refills: 5 | Status: SHIPPED | OUTPATIENT
Start: 2017-12-04 | End: 2018-06-04 | Stop reason: SDUPTHER

## 2017-12-04 NOTE — PROGRESS NOTES
[address]  [address], [address]  [place], [address] [place] [address] Street  Phone: [phone]  Fax: [phone]    Lakshmi Slight  : 1965  PCP: Zoë Garay MD    PROGRESS NOTE    HISTORY OF PRESENT ILLNESS:  Chief Complaint   Patient presents with    Back Pain     Follow up and med refill    Leg Pain     BLE     Rigoberto Kern is a 45 y.o.  female with history of low back pain radiating into hte BLE L>R in an S1 distribution to the ankle. She has a diagnosis of cervical postlami syndrome. She ad a C4-T3 ACDF on 2014 and was later being followed by Dr. Gomez Block for PM.  Per patient, she was abruptly weaned off of Oxycodone and Morphine 2-3 years ago. She continues to use marijuana for pain control. She has failed Cymbalta and gabapentin in the past. She has history of Raynaud. At her last visit, Lyrica was increased to 300 mg BID. Today, she states the Lyrica 300 mg BID is working great. She denies any pain at all. Denies bladder/bowel dysfunction, saddle paresthesia, weakness, gait disturbance, or other neurological deficit. Pt at this time desires to  continue with current care/proceed with medication evaluation. ASSESSMENT  45 y.o. female with a history of low back pain. Diagnoses and all orders for this visit:    1. Radiculopathy, lumbar region       IMPRESSION/PLAN    1) Pt was given information on lumbar exercises. 2) Refill of Lyrica today. 3) continue HEP  4) Ms. Angelica Tidwell has a reminder for a \"due or due soon\" health maintenance. I have asked that she contact her primary care provider, Zoë Garay MD, for follow-up on this health maintenance. 5) We have informed patient to notify us for immediate appointment if he has any worsening neurogical symptoms or if an emergency situation presents, then call 911  5) Pt will follow-up in 6 months. No pain behaviors. Denies thoughts of harming self or others.  Pt has a good risk to benefit ratio which allows the pt to function in a home environment without side effects. PAST MEDICAL HISTORY  Past Medical History:   Diagnosis Date    Cancer [facility])     Skin    Chronic airway obstruction, not elsewhere classified     Essential hypertension, benign     MILDLY ELEVATED    Nausea & vomiting     Nicotine vapor product user     Personal history of tobacco use, presenting hazards to health     Psychiatric disorder     Depression    Raynaud's disease     Shortness of breath     NORMAL STRESS TEST AND LV FUNCTION. LIKELY FROM COPD AND SMOKING        MEDICATIONS  Current Outpatient Prescriptions   Medication Sig Dispense Refill    pregabalin (LYRICA) 300 mg capsule Take 1 Cap by mouth two (2) times a day. Max Daily Amount: 600 mg. 60 Cap 1    diclofenac EC (VOLTAREN) 75 mg EC tablet Take  by mouth.  NIFEdipine XL (PROCARDIA XL) 30 mg tablet Take 90 mg by mouth daily. Indications: hypertension      pentoxifylline CR (TRENTAL) 400 mg CR tablet Take 400 mg by mouth three (3) times daily (with meals). ALLERGIES  No Known Allergies    SOCIAL HISTORY    Social History     Social History    Marital status:      Spouse name: N/A    Number of children: N/A    Years of education: N/A     Occupational History    Not on file.      Social History Main Topics    Smoking status: Former Smoker     Packs/day: 0.50     Years: 30.00    Smokeless tobacco: Never Used      Comment: Uses a Nicotine    Alcohol use No    Drug use: Not on file    Sexual activity: Not on file     Other Topics Concern    Not on file     Social History Narrative       SUBJECTIVE      Pain Scale: 0 - No pain/10    Pain Assessment  12/4/2017   Location of Pain Back;Leg   Location Modifiers Right;Left   Severity of Pain 0   Quality of Pain Other (Comment)   Quality of Pain Comment -   Duration of Pain Other (Comment)   Frequency of Pain Other (Comment)   Aggravating Factors -   Aggravating Factors Comment -   Limiting Behavior No   Relieving Factors Other (Comment)   Relieving Factors Comment Lyrica   Result of Injury No       Accompanied by self. REVIEW OF SYSTEMS  ROS    Constitutional: Negative for fever, chills, or weight change. Respiratory: Negative for cough or shortness of breath. Cardiovascular: Negative for chest pain or palpitations. Gastrointestinal: Negative for acid reflux, change in bowel habits, or constipation. Genitourinary: Negative for incontinence, dysuria and flank pain. Musculoskeletal: Negatvie for  pain. Skin: Negative for rash. Neurological: Negative for headaches, dizziness, or numbness. Endo/Heme/Allergies: Negative . Psychiatric/Behavioral: Negative. PHYSICAL EXAMINATION  Visit Vitals    BP (!) 147/95    Pulse 76    Resp 14    Ht 5' 8\" (1.727 m)    Wt 137 lb 9.6 oz (62.4 kg)    BMI 20.92 kg/m2       Constitutional: Well developed,  well nourished,  awake, alert, and in no acute distress. Neurological:  Sensation to light touch is intact. Psychiatric: Affect and mood are appropriate. Integumentary: No rashes or abrasions noted on exposed areas,  warm, dry and intact. Cardiovascular/Peripheral Vascular:  No peripheral edema is noted. Lymphatic:  No evidence of lymphedema. No cervical lymphadenopathy. SPINE/MUSCULOSKELETAL EXAM        Lumbar spine:  No rash, ecchymosis, or gross obliquity. No fasciculations. No focal atrophy is noted. Range of motion is intact. NoTenderness to palpation. SI joints non-tender. Trochanters non tender. Musculoskeletal:  No pain with extension, axial loading, or forward flexion. No pain with internal or external rotation of her hips. MOTOR     Hip Flex  Quads Hamstrings Ankle DF EHL Ankle PF   Right +4/5 +4/5 +4/5 +4/5 +4/5 +4/5   Left +4/5 +4/5 +4/5 +4/5 +4/5 +4/5   Straight Leg raise negative bilaterally. normal gait and station    Ambulation without assistive device.  full weight bearing, non-antalgic gait.    Dwight Vasquez, NP

## 2017-12-04 NOTE — PATIENT INSTRUCTIONS
Low Back Arthritis: Exercises  Your Care Instructions  Here are some examples of typical rehabilitation exercises for your condition. Start each exercise slowly. Ease off the exercise if you start to have pain. Your doctor or physical therapist will tell you when you can start these exercises and which ones will work best for you. When you are not being active, find a comfortable position for rest. Some people are comfortable on the floor or a medium-firm bed with a small pillow under their head and another under their knees. Some people prefer to lie on their side with a pillow between their knees. Don't stay in one position for too long. Take short walks (10 to 20 minutes) every 2 to 3 hours. Avoid slopes, hills, and stairs until you feel better. Walk only distances you can manage without pain, especially leg pain. How to do the exercises  Pelvic tilt    1. Lie on your back with your knees bent. 2. \"Brace\" your stomach-tighten your muscles by pulling in and imagining your belly button moving toward your spine. 3. Press your lower back into the floor. You should feel your hips and pelvis rock back. 4. Hold for 6 seconds while breathing smoothly. 5. Relax and allow your pelvis and hips to rock forward. 6. Repeat 8 to 12 times. Back stretches    1. Get down on your hands and knees on the floor. 2. Relax your head and allow it to droop. Round your back up toward the ceiling until you feel a nice stretch in your upper, middle, and lower back. Hold this stretch for as long as it feels comfortable, or about 15 to 30 seconds. 3. Return to the starting position with a flat back while you are on your hands and knees. 4. Let your back sway by pressing your stomach toward the floor. Lift your buttocks toward the ceiling. 5. Hold this position for 15 to 30 seconds. 6. Repeat 2 to 4 times. Follow-up care is a key part of your treatment and safety.  Be sure to make and go to all appointments, and call your doctor if you are having problems. It's also a good idea to know your test results and keep a list of the medicines you take. Where can you learn more? Go to http://nelly-linda.info/. Enter Z520 in the search box to learn more about \"Low Back Arthritis: Exercises. \"  Current as of: March 21, 2017  Content Version: 11.4  © 9751-1004 [employer]. Care instructions adapted under license by [employer] (which disclaims liability or warranty for this information). If you have questions about a medical condition or this instruction, always ask your healthcare professional. [address] any warranty or liability for your use of this information. Low Back Pain: Exercises  Your Care Instructions  Here are some examples of typical rehabilitation exercises for your condition. Start each exercise slowly. Ease off the exercise if you start to have pain. Your doctor or physical therapist will tell you when you can start these exercises and which ones will work best for you. How to do the exercises  Press-up    7. Lie on your stomach, supporting your body with your forearms. 8. Press your elbows down into the floor to raise your upper back. As you do this, relax your stomach muscles and allow your back to arch without using your back muscles. As your press up, do not let your hips or pelvis come off the floor. 9. Hold for 15 to 30 seconds, then relax. 10. Repeat 2 to 4 times. Alternate arm and leg (bird dog) exercise    Do this exercise slowly. Try to keep your body straight at all times, and do not let one hip drop lower than the other. 7. Start on the floor, on your hands and knees. 8. Tighten your belly muscles. 9. Raise one leg off the floor, and hold it straight out behind you. Be careful not to let your hip drop down, because that will twist your trunk. 10. Hold for about 6 seconds, then lower your leg and switch to the other leg.   11. Repeat 8 to 12 times on each leg. 12. Over time, work up to holding for 10 to 30 seconds each time. 13. If you feel stable and secure with your leg raised, try raising the opposite arm straight out in front of you at the same time. Knee-to-chest exercise    1. Lie on your back with your knees bent and your feet flat on the floor. 2. Bring one knee to your chest, keeping the other foot flat on the floor (or keeping the other leg straight, whichever feels better on your lower back). 3. Keep your lower back pressed to the floor. Hold for at least 15 to 30 seconds. 4. Relax, and lower the knee to the starting position. 5. Repeat with the other leg. Repeat 2 to 4 times with each leg. 6. To get more stretch, put your other leg flat on the floor while pulling your knee to your chest.  Curl-ups    1. Lie on the floor on your back with your knees bent at a 90-degree angle. Your feet should be flat on the floor, about 12 inches from your buttocks. 2. Cross your arms over your chest. If this bothers your neck, try putting your hands behind your neck (not your head), with your elbows spread apart. 3. Slowly tighten your belly muscles and raise your shoulder blades off the floor. 4. Keep your head in line with your body, and do not press your chin to your chest.  5. Hold this position for 1 or 2 seconds, then slowly lower yourself back down to the floor. 6. Repeat 8 to 12 times. Pelvic tilt exercise    1. Lie on your back with your knees bent. 2. \"Brace\" your stomach. This means to tighten your muscles by pulling in and imagining your belly button moving toward your spine. You should feel like your back is pressing to the floor and your hips and pelvis are rocking back. 3. Hold for about 6 seconds while you breathe smoothly. 4. Repeat 8 to 12 times. Heel dig bridging    1. Lie on your back with both knees bent and your ankles bent so that only your heels are digging into the floor.  Your knees should be bent about 90 degrees. 2. Then push your heels into the floor, squeeze your buttocks, and lift your hips off the floor until your shoulders, hips, and knees are all in a straight line. 3. Hold for about 6 seconds as you continue to breathe normally, and then slowly lower your hips back down to the floor and rest for up to 10 seconds. 4. Do 8 to 12 repetitions. Hamstring stretch in doorway    1. Lie on your back in a doorway, with one leg through the open door. 2. Slide your leg up the wall to straighten your knee. You should feel a gentle stretch down the back of your leg. 3. Hold the stretch for at least 15 to 30 seconds. Do not arch your back, point your toes, or bend either knee. Keep one heel touching the floor and the other heel touching the wall. 4. Repeat with your other leg. 5. Do 2 to 4 times for each leg. Hip flexor stretch    1. Kneel on the floor with one knee bent and one leg behind you. Place your forward knee over your foot. Keep your other knee touching the floor. 2. Slowly push your hips forward until you feel a stretch in the upper thigh of your rear leg. 3. Hold the stretch for at least 15 to 30 seconds. Repeat with your other leg. 4. Do 2 to 4 times on each side. Wall sit    1. Stand with your back 10 to 12 inches away from a wall. 2. Lean into the wall until your back is flat against it. 3. Slowly slide down until your knees are slightly bent, pressing your lower back into the wall. 4. Hold for about 6 seconds, then slide back up the wall. 5. Repeat 8 to 12 times. Follow-up care is a key part of your treatment and safety. Be sure to make and go to all appointments, and call your doctor if you are having problems. It's also a good idea to know your test results and keep a list of the medicines you take. Where can you learn more? Go to http://nelly-linda.info/. Enter F031 in the search box to learn more about \"Low Back Pain: Exercises. \"  Current as of: March 21, 2017  Content Version: 11.4  © 6920-1109 [employer]. Care instructions adapted under license by [employer] (which disclaims liability or warranty for this information). If you have questions about a medical condition or this instruction, always ask your healthcare professional. [address] any warranty or liability for your use of this information.

## 2018-06-04 ENCOUNTER — OFFICE VISIT (OUTPATIENT)
Dept: ORTHOPEDIC SURGERY | Age: 53
End: 2018-06-04

## 2018-06-04 VITALS
DIASTOLIC BLOOD PRESSURE: 78 MMHG | HEIGHT: 68 IN | RESPIRATION RATE: 14 BRPM | WEIGHT: 135.6 LBS | SYSTOLIC BLOOD PRESSURE: 151 MMHG | HEART RATE: 95 BPM | BODY MASS INDEX: 20.55 KG/M2

## 2018-06-04 DIAGNOSIS — M47.816 SPONDYLOSIS OF LUMBAR REGION WITHOUT MYELOPATHY OR RADICULOPATHY: ICD-10-CM

## 2018-06-04 DIAGNOSIS — M54.16 RADICULOPATHY, LUMBAR REGION: ICD-10-CM

## 2018-06-04 RX ORDER — NIFEDIPINE 30 MG/1
TABLET, FILM COATED, EXTENDED RELEASE ORAL
Refills: 1 | COMMUNITY
Start: 2018-05-11

## 2018-06-04 RX ORDER — CHLORTHALIDONE 25 MG/1
TABLET ORAL
Refills: 5 | COMMUNITY
Start: 2018-05-11

## 2018-06-04 RX ORDER — METHYLPREDNISOLONE 4 MG/1
TABLET ORAL
Qty: 1 DOSE PACK | Refills: 0 | Status: SHIPPED | OUTPATIENT
Start: 2018-06-04

## 2018-06-04 RX ORDER — IBUPROFEN 800 MG/1
TABLET ORAL
Refills: 2 | COMMUNITY
Start: 2018-05-25

## 2018-06-04 RX ORDER — ERGOCALCIFEROL 1.25 MG/1
CAPSULE ORAL
Refills: 0 | COMMUNITY
Start: 2018-05-30

## 2018-06-04 RX ORDER — PREGABALIN 300 MG/1
300 CAPSULE ORAL 2 TIMES DAILY
Qty: 60 CAP | Refills: 5 | Status: SHIPPED | OUTPATIENT
Start: 2018-06-04

## 2018-06-04 RX ORDER — LORAZEPAM 0.5 MG/1
TABLET ORAL
Refills: 0 | COMMUNITY
Start: 2018-05-21
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
## 2018-06-04 NOTE — PATIENT INSTRUCTIONS
Learning About How to Have a Healthy Back  What causes back pain? Back pain is often caused by overuse, strain, or injury. For example, people often hurt their backs playing sports or working in the yard, being jolted in a car accident, or lifting something too heavy. Aging plays a part too. Your bones and muscles tend to lose strength as you age, which makes injury more likely. The spongy discs between the bones of the spine (vertebrae) may suffer from wear and tear and no longer provide enough cushion between the bones. A disc that bulges or breaks open (herniated disc) can press on nerves, causing back pain. In some people, back pain is the result of arthritis, broken vertebrae caused by bone loss (osteoporosis), illness, or a spine problem. Although most people have back pain at one time or another, there are steps you can take to make it less likely. How can you have a healthy back? Reduce stress on your back through good posture  Slumping or slouching alone may not cause low back pain. But after the back has been strained or injured, bad posture can make pain worse. · Sleep in a position that maintains your back's normal curves and on a mattress that feels comfortable. Sleep on your side with a pillow between your knees, or sleep on your back with a pillow under your knees. These positions can reduce strain on your back. · Stand and sit up straight. \"Good posture\" generally means your ears, shoulders, and hips are in a straight line. · If you must stand for a long time, put one foot on a stool, ledge, or box. Switch feet every now and then. · Sit in a chair that is low enough to let you place both feet flat on the floor with both knees nearly level with your hips. If your chair or desk is too high, use a footrest to raise your knees. Place a small pillow, a rolled-up towel, or a lumbar roll in the curve of your back if you need extra support.   · Try a kneeling chair, which helps tilt your hips forward. This takes pressure off your lower back. · Try sitting on an exercise ball. It can rock from side to side, which helps keep your back loose. · When driving, keep your knees nearly level with your hips. Sit straight, and drive with both hands on the steering wheel. Your arms should be in a slightly bent position. Reduce stress on your back through careful lifting  · Squat down, bending at the hips and knees only. If you need to, put one knee to the floor and extend your other knee in front of you, bent at a right angle (half kneeling). · Press your chest straight forward. This helps keep your upper back straight while keeping a slight arch in your low back. · Hold the load as close to your body as possible, at the level of your belly button (navel). · Use your feet to change direction, taking small steps. · Lead with your hips as you change direction. Keep your shoulders in line with your hips as you move. · Set down your load carefully, squatting with your knees and hips only. Exercise and stretch your back  · Do some exercise on most days of the week, if your doctor says it is okay. You can walk, run, swim, or cycle. · Stretch your back muscles. Here are a few exercises to try:  Loida Angelok on your back, and gently pull one bent knee to your chest. Put that foot back on the floor, and then pull the other knee to your chest.  ¨ Do pelvic tilts. Lie on your back with your knees bent. Tighten your stomach muscles. Pull your belly button (navel) in and up toward your ribs. You should feel like your back is pressing to the floor and your hips and pelvis are slightly lifting off the floor. Hold for 6 seconds while breathing smoothly. ¨ Sit with your back flat against a wall. · Keep your core muscles strong. The muscles of your back, belly (abdomen), and buttocks support your spine. ¨ Pull in your belly and imagine pulling your navel toward your spine. Hold this for 6 seconds, then relax.  Remember to keep breathing normally as you tense your muscles. ¨ Do curl-ups. Always do them with your knees bent. Keep your low back on the floor, and curl your shoulders toward your knees using a smooth, slow motion. Keep your arms folded across your chest. If this bothers your neck, try putting your hands behind your neck (not your head), with your elbows spread apart. ¨ Lie on your back with your knees bent and your feet flat on the floor. Tighten your belly muscles, and then push with your feet and raise your buttocks up a few inches. Hold this position 6 seconds as you continue to breathe normally, then lower yourself slowly to the floor. Repeat 8 to 12 times. ¨ If you like group exercise, try Pilates or yoga. These classes have poses that strengthen the core muscles. Lead a healthy lifestyle  · Stay at a healthy weight to avoid strain on your back. · Do not smoke. Smoking increases the risk of osteoporosis, which weakens the spine. If you need help quitting, talk to your doctor about stop-smoking programs and medicines. These can increase your chances of quitting for good. Where can you learn more? Go to http://nellyAyalogiclinda.info/. Enter L315 in the search box to learn more about \"Learning About How to Have a Healthy Back. \"  Current as of: March 21, 2017  Content Version: 11.4  © 1450-4505 [employer]. Care instructions adapted under license by [employer] (which disclaims liability or warranty for this information). If you have questions about a medical condition or this instruction, always ask your healthcare professional. [place] [address] any warranty or liability for your use of this information. Low Back Pain: Exercises  Your Care Instructions  Here are some examples of typical rehabilitation exercises for your condition. Start each exercise slowly. Ease off the exercise if you start to have pain.   Your doctor or physical therapist will tell you when you can start these exercises and which ones will work best for you. How to do the exercises  Press-up    1. Lie on your stomach, supporting your body with your forearms. 2. Press your elbows down into the floor to raise your upper back. As you do this, relax your stomach muscles and allow your back to arch without using your back muscles. As your press up, do not let your hips or pelvis come off the floor. 3. Hold for 15 to 30 seconds, then relax. 4. Repeat 2 to 4 times. Alternate arm and leg (bird dog) exercise    Do this exercise slowly. Try to keep your body straight at all times, and do not let one hip drop lower than the other. 1. Start on the floor, on your hands and knees. 2. Tighten your belly muscles. 3. Raise one leg off the floor, and hold it straight out behind you. Be careful not to let your hip drop down, because that will twist your trunk. 4. Hold for about 6 seconds, then lower your leg and switch to the other leg. 5. Repeat 8 to 12 times on each leg. 6. Over time, work up to holding for 10 to 30 seconds each time. 7. If you feel stable and secure with your leg raised, try raising the opposite arm straight out in front of you at the same time. Knee-to-chest exercise    1. Lie on your back with your knees bent and your feet flat on the floor. 2. Bring one knee to your chest, keeping the other foot flat on the floor (or keeping the other leg straight, whichever feels better on your lower back). 3. Keep your lower back pressed to the floor. Hold for at least 15 to 30 seconds. 4. Relax, and lower the knee to the starting position. 5. Repeat with the other leg. Repeat 2 to 4 times with each leg. 6. To get more stretch, put your other leg flat on the floor while pulling your knee to your chest.  Curl-ups    1. Lie on the floor on your back with your knees bent at a 90-degree angle. Your feet should be flat on the floor, about 12 inches from your buttocks.   2. Cross your arms over your chest. If this bothers your neck, try putting your hands behind your neck (not your head), with your elbows spread apart. 3. Slowly tighten your belly muscles and raise your shoulder blades off the floor. 4. Keep your head in line with your body, and do not press your chin to your chest.  5. Hold this position for 1 or 2 seconds, then slowly lower yourself back down to the floor. 6. Repeat 8 to 12 times. Pelvic tilt exercise    1. Lie on your back with your knees bent. 2. \"Brace\" your stomach. This means to tighten your muscles by pulling in and imagining your belly button moving toward your spine. You should feel like your back is pressing to the floor and your hips and pelvis are rocking back. 3. Hold for about 6 seconds while you breathe smoothly. 4. Repeat 8 to 12 times. Heel dig bridging    1. Lie on your back with both knees bent and your ankles bent so that only your heels are digging into the floor. Your knees should be bent about 90 degrees. 2. Then push your heels into the floor, squeeze your buttocks, and lift your hips off the floor until your shoulders, hips, and knees are all in a straight line. 3. Hold for about 6 seconds as you continue to breathe normally, and then slowly lower your hips back down to the floor and rest for up to 10 seconds. 4. Do 8 to 12 repetitions. Hamstring stretch in doorway    1. Lie on your back in a doorway, with one leg through the open door. 2. Slide your leg up the wall to straighten your knee. You should feel a gentle stretch down the back of your leg. 3. Hold the stretch for at least 15 to 30 seconds. Do not arch your back, point your toes, or bend either knee. Keep one heel touching the floor and the other heel touching the wall. 4. Repeat with your other leg. 5. Do 2 to 4 times for each leg. Hip flexor stretch    1. Kneel on the floor with one knee bent and one leg behind you. Place your forward knee over your foot.  Keep your other knee touching the floor. 2. Slowly push your hips forward until you feel a stretch in the upper thigh of your rear leg. 3. Hold the stretch for at least 15 to 30 seconds. Repeat with your other leg. 4. Do 2 to 4 times on each side. Wall sit    1. Stand with your back 10 to 12 inches away from a wall. 2. Lean into the wall until your back is flat against it. 3. Slowly slide down until your knees are slightly bent, pressing your lower back into the wall. 4. Hold for about 6 seconds, then slide back up the wall. 5. Repeat 8 to 12 times. Follow-up care is a key part of your treatment and safety. Be sure to make and go to all appointments, and call your doctor if you are having problems. It's also a good idea to know your test results and keep a list of the medicines you take. Where can you learn more? Go to http://nelly-linda.info/. Enter H233 in the search box to learn more about \"Low Back Pain: Exercises. \"  Current as of: March 21, 2017  Content Version: 11.4  © 8748-2699 [employer], [employer]. Care instructions adapted under license by [employer] (which disclaims liability or warranty for this information). If you have questions about a medical condition or this instruction, always ask your healthcare professional. [place] [address] any warranty or liability for your use of this information.

## 2018-06-04 NOTE — MR AVS SNAPSHOT
[address] [facility] 
 
 
 [address] [facility] 
[phone] Patient: Angel Clay MRN: [id number] :1965 Visit Information Date & Time Provider Department Dept. Phone Encounter #  
 2018  9:00 AM  OLEGARIO Calles [facility] [place] [phone] [id number] Upcoming Health Maintenance Date Due Hepatitis C Screening 1965 Pneumococcal 19-64 Medium Risk (1 of 1 - PPSV23) 1982 DTaP/Tdap/Td series (1 - Tdap) 1984 PAP AKA CERVICAL CYTOLOGY 1984 BREAST CANCER SCRN MAMMOGRAM 2013 FOBT Q 1 YEAR AGE 50-75 2013 Influenza Age 5 to Adult 2018 Allergies as of 2018  Review Complete On: 2018 By: Gt Carey LPN No Known Allergies Current Immunizations  Never Reviewed No immunizations on file. Not reviewed this visit You Were Diagnosed With   
  
 Codes Comments Radiculopathy, lumbar region     ICD-10-CM: M54.16 
ICD-9-CM: 724.4 Spondylosis of lumbar region without myelopathy or radiculopathy     ICD-10-CM: M47.816 ICD-9-CM: 721.3 Vitals BP Pulse Resp Height(growth percentile) Weight(growth percentile) BMI  
 151/78 95 14 5' 8\" (1.727 m) 135 lb 9.6 oz (61.5 kg) 20.62 kg/m2 OB Status Smoking Status Hysterectomy Former Smoker Vitals History BMI and BSA Data Body Mass Index Body Surface Area  
 20.62 kg/m 2 1.72 m 2 Preferred Pharmacy Pharmacy Name Phone [id number] [address] [address] Your Updated Medication List  
  
   
This list is accurate as of 18  9:29 AM.  Always use your most recent med list.  
  
  
  
  
 chlorthalidone 25 mg tablet Commonly known as:  HYGROTEN  
TAKE 1 TABLET BY MOUTH EVERY DAY  
  
 diclofenac EC 75 mg EC tablet Commonly known as:  VOLTAREN Take  by mouth. ergocalciferol 50,000 unit capsule Commonly known as:  ERGOCALCIFEROL  
TAKE 1 CAPSULE ONCE A WEEK  
  
 ibuprofen 800 mg tablet Commonly known as:  MOTRIN  
TAKE 1 TABLET BY MOUTH EVERY 6 HOURS AS NEEDED LORazepam 0.5 mg tablet Commonly known as:  ATIVAN  
TAKE 1 TAB BY MOUTH 2 TIMES DAILY AS NEEDED. methylPREDNISolone 4 mg tablet Commonly known as:  MEDROL (JANELL) Per dose pack instructions  
  
 pregabalin 300 mg capsule Commonly known as:  Lakia Chloe Take 1 Cap by mouth two (2) times a day. Max Daily Amount: 600 mg.  
  
 * PROCARDIA XL 30 mg ER tablet Generic drug:  NIFEdipine ER Take 90 mg by mouth daily. Indications: hypertension * NIFEdipine ER 30 mg ER tablet Commonly known as:  ADALAT CC  
TAKE 3 TABS BY MOUTH ONCE A DAY. TRENtal 400 mg CR tablet Generic drug:  pentoxifylline CR Take 400 mg by mouth three (3) times daily (with meals). * Notice: This list has 2 medication(s) that are the same as other medications prescribed for you. Read the directions carefully, and ask your doctor or other care provider to review them with you. Prescriptions Printed Refills  
 pregabalin (LYRICA) 300 mg capsule 5 Sig: Take 1 Cap by mouth two (2) times a day. Max Daily Amount: 600 mg. Class: Print Route: Oral  
  
Prescriptions Sent to Pharmacy Refills  
 methylPREDNISolone (MEDROL, JANELL,) 4 mg tablet 0 Sig: Per dose pack instructions Class: Normal  
 Pharmacy[facility], [address] [place] [address] #: [phone] Patient Instructions Learning About How to Have a Healthy Back What causes back pain? Back pain is often caused by overuse, strain, or injury. For example, people often hurt their backs playing sports or working in the yard, being jolted in a car accident, or lifting something too heavy. Aging plays a part too.  Your bones and muscles tend to lose strength as you age, which makes injury more likely. The spongy discs between the bones of the spine (vertebrae) may suffer from wear and tear and no longer provide enough cushion between the bones. A disc that bulges or breaks open (herniated disc) can press on nerves, causing back pain. In some people, back pain is the result of arthritis, broken vertebrae caused by bone loss (osteoporosis), illness, or a spine problem. Although most people have back pain at one time or another, there are steps you can take to make it less likely. How can you have a healthy back? Reduce stress on your back through good posture Slumping or slouching alone may not cause low back pain. But after the back has been strained or injured, bad posture can make pain worse. · Sleep in a position that maintains your back's normal curves and on a mattress that feels comfortable. Sleep on your side with a pillow between your knees, or sleep on your back with a pillow under your knees. These positions can reduce strain on your back. · Stand and sit up straight. \"Good posture\" generally means your ears, shoulders, and hips are in a straight line. · If you must stand for a long time, put one foot on a stool, ledge, or box. Switch feet every now and then. · Sit in a chair that is low enough to let you place both feet flat on the floor with both knees nearly level with your hips. If your chair or desk is too high, use a footrest to raise your knees. Place a small pillow, a rolled-up towel, or a lumbar roll in the curve of your back if you need extra support. · Try a kneeling chair, which helps tilt your hips forward. This takes pressure off your lower back. · Try sitting on an exercise ball. It can rock from side to side, which helps keep your back loose. · When driving, keep your knees nearly level with your hips. Sit straight, and drive with both hands on the steering wheel. Your arms should be in a slightly bent position. Reduce stress on your back through careful lifting · Squat down, bending at the hips and knees only. If you need to, put one knee to the floor and extend your other knee in front of you, bent at a right angle (half kneeling). · Press your chest straight forward. This helps keep your upper back straight while keeping a slight arch in your low back. · Hold the load as close to your body as possible, at the level of your belly button (navel). · Use your feet to change direction, taking small steps. · Lead with your hips as you change direction. Keep your shoulders in line with your hips as you move. · Set down your load carefully, squatting with your knees and hips only. Exercise and stretch your back · Do some exercise on most days of the week, if your doctor says it is okay. You can walk, run, swim, or cycle. · Stretch your back muscles. Here are a few exercises to try: ¨ Lie on your back, and gently pull one bent knee to your chest. Put that foot back on the floor, and then pull the other knee to your chest. 
¨ Do pelvic tilts. Lie on your back with your knees bent. Tighten your stomach muscles. Pull your belly button (navel) in and up toward your ribs. You should feel like your back is pressing to the floor and your hips and pelvis are slightly lifting off the floor. Hold for 6 seconds while breathing smoothly. ¨ Sit with your back flat against a wall. · Keep your core muscles strong. The muscles of your back, belly (abdomen), and buttocks support your spine. ¨ Pull in your belly and imagine pulling your navel toward your spine. Hold this for 6 seconds, then relax. Remember to keep breathing normally as you tense your muscles. ¨ Do curl-ups. Always do them with your knees bent. Keep your low back on the floor, and curl your shoulders toward your knees using a smooth, slow motion.  Keep your arms folded across your chest. If this bothers your neck, try putting your hands behind your neck (not your head), with your elbows spread apart. ¨ Lie on your back with your knees bent and your feet flat on the floor. Tighten your belly muscles, and then push with your feet and raise your buttocks up a few inches. Hold this position 6 seconds as you continue to breathe normally, then lower yourself slowly to the floor. Repeat 8 to 12 times. ¨ If you like group exercise, try Pilates or yoga. These classes have poses that strengthen the core muscles. Lead a healthy lifestyle · Stay at a healthy weight to avoid strain on your back. · Do not smoke. Smoking increases the risk of osteoporosis, which weakens the spine. If you need help quitting, talk to your doctor about stop-smoking programs and medicines. These can increase your chances of quitting for good. Where can you learn more? Go to http://nellyIzzy Moneylinda.info/. Enter L315 in the search box to learn more about \"Learning About How to Have a Healthy Back. \" Current as of: March 21, 2017 Content Version: 11.4 © 9190-5716 [employer]. Care instructions adapted under license by [employer] (which disclaims liability or warranty for this information). If you have questions about a medical condition or this instruction, always ask your healthcare professional. [address] any warranty or liability for your use of this information. Low Back Pain: Exercises Your Care Instructions Here are some examples of typical rehabilitation exercises for your condition. Start each exercise slowly. Ease off the exercise if you start to have pain. Your doctor or physical therapist will tell you when you can start these exercises and which ones will work best for you. How to do the exercises Press-up 1. Lie on your stomach, supporting your body with your forearms. 2. Press your elbows down into the floor to raise your upper back.  As you do this, relax your stomach muscles and allow your back to arch without using your back muscles. As your press up, do not let your hips or pelvis come off the floor. 3. Hold for 15 to 30 seconds, then relax. 4. Repeat 2 to 4 times. Alternate arm and leg (bird dog) exercise Do this exercise slowly. Try to keep your body straight at all times, and do not let one hip drop lower than the other. 1. Start on the floor, on your hands and knees. 2. Tighten your belly muscles. 3. Raise one leg off the floor, and hold it straight out behind you. Be careful not to let your hip drop down, because that will twist your trunk. 4. Hold for about 6 seconds, then lower your leg and switch to the other leg. 5. Repeat 8 to 12 times on each leg. 6. Over time, work up to holding for 10 to 30 seconds each time. 7. If you feel stable and secure with your leg raised, try raising the opposite arm straight out in front of you at the same time. Knee-to-chest exercise 1. Lie on your back with your knees bent and your feet flat on the floor. 2. Bring one knee to your chest, keeping the other foot flat on the floor (or keeping the other leg straight, whichever feels better on your lower back). 3. Keep your lower back pressed to the floor. Hold for at least 15 to 30 seconds. 4. Relax, and lower the knee to the starting position. 5. Repeat with the other leg. Repeat 2 to 4 times with each leg. 6. To get more stretch, put your other leg flat on the floor while pulling your knee to your chest. 
Curl-ups 1. Lie on the floor on your back with your knees bent at a 90-degree angle. Your feet should be flat on the floor, about 12 inches from your buttocks. 2. Cross your arms over your chest. If this bothers your neck, try putting your hands behind your neck (not your head), with your elbows spread apart. 3. Slowly tighten your belly muscles and raise your shoulder blades off the floor. 4. Keep your head in line with your body, and do not press your chin to your chest. 
5. Hold this position for 1 or 2 seconds, then slowly lower yourself back down to the floor. 6. Repeat 8 to 12 times. Pelvic tilt exercise 1. Lie on your back with your knees bent. 2. \"Brace\" your stomach. This means to tighten your muscles by pulling in and imagining your belly button moving toward your spine. You should feel like your back is pressing to the floor and your hips and pelvis are rocking back. 3. Hold for about 6 seconds while you breathe smoothly. 4. Repeat 8 to 12 times. Heel dig bridging 1. Lie on your back with both knees bent and your ankles bent so that only your heels are digging into the floor. Your knees should be bent about 90 degrees. 2. Then push your heels into the floor, squeeze your buttocks, and lift your hips off the floor until your shoulders, hips, and knees are all in a straight line. 3. Hold for about 6 seconds as you continue to breathe normally, and then slowly lower your hips back down to the floor and rest for up to 10 seconds. 4. Do 8 to 12 repetitions. Hamstring stretch in doorway 1. Lie on your back in a doorway, with one leg through the open door. 2. Slide your leg up the wall to straighten your knee. You should feel a gentle stretch down the back of your leg. 3. Hold the stretch for at least 15 to 30 seconds. Do not arch your back, point your toes, or bend either knee. Keep one heel touching the floor and the other heel touching the wall. 4. Repeat with your other leg. 5. Do 2 to 4 times for each leg. Hip flexor stretch 1. Kneel on the floor with one knee bent and one leg behind you. Place your forward knee over your foot. Keep your other knee touching the floor. 2. Slowly push your hips forward until you feel a stretch in the upper thigh of your rear leg. 3. Hold the stretch for at least 15 to 30 seconds. Repeat with your other leg. 4. Do 2 to 4 times on each side. Wall sit 1. Stand with your back 10 to 12 inches away from a wall. 2. Lean into the wall until your back is flat against it. 3. Slowly slide down until your knees are slightly bent, pressing your lower back into the wall. 4. Hold for about 6 seconds, then slide back up the wall. 5. Repeat 8 to 12 times. Follow-up care is a key part of your treatment and safety. Be sure to make and go to all appointments, and call your doctor if you are having problems. It's also a good idea to know your test results and keep a list of the medicines you take. Where can you learn more? Go to http://nelly-linda.info/. Enter L137 in the search box to learn more about \"Low Back Pain: Exercises. \" Current as of: March 21, 2017 Content Version: 11.4 © 1422-2297 [employer]. Care instructions adapted under license by "[employer] (which disclaims liability or warranty for this information). If you have questions about a medical condition or this instruction, always ask your healthcare professional. [place] [address] any warranty or liability for your use of this information. Introducing [facility]! Dear Sumi Austin: Thank you for requesting a [employer] account. Our records indicate that you already have an active [employer] account. You can access your account anytime at [email or web address] Did you know that you can access your hospital and ER discharge instructions at any time in [employer]? You can also review all of your test results from your hospital stay or ER visit. Additional Information If you have questions, please visit the Frequently Asked Questions section of the [employer] website at [email or web address]/. Remember, [employer] is NOT to be used for urgent needs. For medical emergencies, dial 911. Now available from your iPhone and Android! Please provide this summary of care documentation to your next provider. Your primary care clinician is listed as Emigido Forbes. If you have any questions after today's visit, please call [phone].

## 2018-07-05 ENCOUNTER — TELEPHONE (OUTPATIENT)
Dept: ORTHOPEDIC SURGERY | Age: 53
End: 2018-07-05

## 2018-07-05 NOTE — TELEPHONE ENCOUNTER
Attempted to return call to patient, Reached unidentified voicemail, left message, identified myself/facility/callback number, requested return call to facility.

## 2018-07-05 NOTE — TELEPHONE ENCOUNTER
Pt called states the medical cannibas use in [facility] was approved 2 days ago and is available at numerous dispensaries w/in [facility]. Pt would like to know if dr Makenna Bragg can now prescribe it as previously she touched bases with dr Makenna Bragg on this subject. Pt was last seen 6/4/18 by Np buttery.     Please advise:  Pt T#[phone]

## 2018-07-05 NOTE — TELEPHONE ENCOUNTER
Returned call to patient, verified , informed patient of below message per NP Katherin. Per patient she will have to do some research on this and give us a call back, because she does not understand. No further action required at this time.